# Patient Record
Sex: FEMALE | Race: BLACK OR AFRICAN AMERICAN | NOT HISPANIC OR LATINO | Employment: FULL TIME | URBAN - METROPOLITAN AREA
[De-identification: names, ages, dates, MRNs, and addresses within clinical notes are randomized per-mention and may not be internally consistent; named-entity substitution may affect disease eponyms.]

---

## 2019-02-11 ENCOUNTER — LAB VISIT (OUTPATIENT)
Dept: LAB | Facility: OTHER | Age: 49
End: 2019-02-11
Attending: OBSTETRICS & GYNECOLOGY
Payer: COMMERCIAL

## 2019-02-11 ENCOUNTER — OFFICE VISIT (OUTPATIENT)
Dept: OBSTETRICS AND GYNECOLOGY | Facility: CLINIC | Age: 49
End: 2019-02-11
Attending: OBSTETRICS & GYNECOLOGY
Payer: COMMERCIAL

## 2019-02-11 VITALS
WEIGHT: 140.19 LBS | HEIGHT: 66 IN | DIASTOLIC BLOOD PRESSURE: 78 MMHG | SYSTOLIC BLOOD PRESSURE: 122 MMHG | BODY MASS INDEX: 22.53 KG/M2

## 2019-02-11 DIAGNOSIS — Z12.11 COLON CANCER SCREENING: ICD-10-CM

## 2019-02-11 DIAGNOSIS — Z01.419 WELL WOMAN EXAM WITH ROUTINE GYNECOLOGICAL EXAM: Primary | ICD-10-CM

## 2019-02-11 DIAGNOSIS — Z01.419 WELL WOMAN EXAM WITH ROUTINE GYNECOLOGICAL EXAM: ICD-10-CM

## 2019-02-11 DIAGNOSIS — R63.4 WEIGHT LOSS: ICD-10-CM

## 2019-02-11 DIAGNOSIS — R19.7 DIARRHEA, UNSPECIFIED TYPE: ICD-10-CM

## 2019-02-11 DIAGNOSIS — Z12.31 VISIT FOR SCREENING MAMMOGRAM: ICD-10-CM

## 2019-02-11 DIAGNOSIS — R35.0 URINARY FREQUENCY: ICD-10-CM

## 2019-02-11 LAB
T4 FREE SERPL-MCNC: 2.33 NG/DL
TSH SERPL DL<=0.005 MIU/L-ACNC: <0.01 UIU/ML

## 2019-02-11 PROCEDURE — 88141 LIQUID-BASED PAP SMEAR, SCREENING: ICD-10-PCS | Mod: ,,, | Performed by: PATHOLOGY

## 2019-02-11 PROCEDURE — 36415 COLL VENOUS BLD VENIPUNCTURE: CPT

## 2019-02-11 PROCEDURE — 87624 HPV HI-RISK TYP POOLED RSLT: CPT

## 2019-02-11 PROCEDURE — 88141 CYTOPATH C/V INTERPRET: CPT | Mod: ,,, | Performed by: PATHOLOGY

## 2019-02-11 PROCEDURE — 99386 PR PREVENTIVE VISIT,NEW,40-64: ICD-10-PCS | Mod: S$GLB,,, | Performed by: OBSTETRICS & GYNECOLOGY

## 2019-02-11 PROCEDURE — 84439 ASSAY OF FREE THYROXINE: CPT

## 2019-02-11 PROCEDURE — 87086 URINE CULTURE/COLONY COUNT: CPT

## 2019-02-11 PROCEDURE — 83036 HEMOGLOBIN GLYCOSYLATED A1C: CPT

## 2019-02-11 PROCEDURE — 99386 PREV VISIT NEW AGE 40-64: CPT | Mod: S$GLB,,, | Performed by: OBSTETRICS & GYNECOLOGY

## 2019-02-11 PROCEDURE — 99999 PR PBB SHADOW E&M-NEW PATIENT-LVL III: CPT | Mod: PBBFAC,,, | Performed by: OBSTETRICS & GYNECOLOGY

## 2019-02-11 PROCEDURE — 99999 PR PBB SHADOW E&M-NEW PATIENT-LVL III: ICD-10-PCS | Mod: PBBFAC,,, | Performed by: OBSTETRICS & GYNECOLOGY

## 2019-02-11 PROCEDURE — 88175 CYTOPATH C/V AUTO FLUID REDO: CPT | Performed by: PATHOLOGY

## 2019-02-11 PROCEDURE — 84443 ASSAY THYROID STIM HORMONE: CPT

## 2019-02-11 NOTE — PROGRESS NOTES
SUBJECTIVE:   48 y.o. female   for annual routine Pap and checkup. Patient's last menstrual period was 2019..  She complains of weight loss, frequent stool and urination.        Past Medical History:   Diagnosis Date    Abnormal cervical Papanicolaou smear 2014    Colposcopy    Anemia     Gestational diabetes      Past Surgical History:   Procedure Laterality Date    COLPOSCOPY      DILATION AND CURETTAGE OF UTERUS       Social History     Socioeconomic History    Marital status:      Spouse name: Not on file    Number of children: Not on file    Years of education: Not on file    Highest education level: Not on file   Social Needs    Financial resource strain: Not on file    Food insecurity - worry: Not on file    Food insecurity - inability: Not on file    Transportation needs - medical: Not on file    Transportation needs - non-medical: Not on file   Occupational History    Not on file   Tobacco Use    Smoking status: Never Smoker    Smokeless tobacco: Never Used   Substance and Sexual Activity    Alcohol use: No    Drug use: No    Sexual activity: Yes     Partners: Male   Other Topics Concern    Not on file   Social History Narrative    Not on file     Family History   Problem Relation Age of Onset    Breast cancer Cousin     Ovarian cancer Neg Hx     Colon cancer Neg Hx      OB History    Para Term  AB Living   4 2 1 1 2 1   SAB TAB Ectopic Multiple Live Births   2       2      # Outcome Date GA Lbr Travon/2nd Weight Sex Delivery Anes PTL Lv   4  13 36w0d  3.005 kg (6 lb 10 oz) M Vag-Spont EPI N ND      Birth Comments: child  on 2013   3 Term 04 39w0d  3.402 kg (7 lb 8 oz) M  EPI N AMAN   2 2002 11w0d      N    1 2001 8w0d      N             No current outpatient medications on file.     No current facility-administered medications for this visit.      Allergies: Patient has no known allergies.     ROS:  Constitutional:  "no weight loss, weight gain, fever, fatigue  Eyes:  No vision changes, glasses/contacts  ENT/Mouth: No ulcers, sinus problems, ears ringing, headache  Cardiovascular: No inability to lie flat, chest pain, exercise intolerance, swelling, heart palpitations  Respiratory: No wheezing, coughing blood, shortness of breath, or cough  Gastrointestinal: No diarrhea, bloody stool, nausea/vomiting, constipation, gas, hemorrhoids  Genitourinary: No blood in urine, painful urination, urgency of urination, frequency of urination, incomplete emptying, incontinence, abnormal bleeding, painful periods, heavy periods, vaginal discharge, vaginal odor, painful intercourse, sexual problems, bleeding after intercourse.  Musculoskeletal: No muscle weakness  Skin/Breast: No painful breasts, nipple discharge, masses, rash, ulcers  Neurological: No passing out, seizures, numbness, headache  Endocrine: No diabetes, hypothyroid, hyperthyroid, hot flashes, hair loss, abnormal hair growth, ance  Psychiatric: No depression, crying  Hematologic: No bruises, bleeding, swollen lymph nodes, anemia.      OBJECTIVE:   The patient appears well, alert, oriented x 3, in no distress.  /78   Ht 5' 6" (1.676 m)   Wt 63.6 kg (140 lb 3.4 oz)   LMP 01/27/2019   BMI 22.63 kg/m²   NECK: no thyromegaly, trachea midline  SKIN: no acne, striae, hirsutism  BREAST EXAM: breasts appear normal, no suspicious masses, no skin or nipple changes or axillary nodes  ABDOMEN: no hernias, masses, or hepatosplenomegaly  GENITALIA: normal external genitalia, no erythema, no discharge  URETHRA: normal urethra, normal urethral meatus  VAGINA: Normal  CERVIX: no lesions or cervical motion tenderness  UTERUS: normal size, contour, position, consistency, mobility, non-tender  ADNEXA: normal adnexa and no mass, fullness, tenderness    \  ASSESSMENT:   .Lacey Barnes was seen today for well woman and irregualar bleeding.    Diagnoses and all orders for this visit:    Well " woman exam with routine gynecological exam  -     Liquid-based pap smear, screening  -     HPV High Risk Genotypes, PCR  -     Hemoglobin A1c; Future    Visit for screening mammogram  -     Mammo Digital Screening Bilat; Future    Urinary frequency  -     Urine culture    Weight loss  -     TSH; Future    Diarrhea, unspecified type  -     TSH; Future    Colon cancer screening  -     Ambulatory consult to Gastroenterology

## 2019-02-12 ENCOUNTER — HOSPITAL ENCOUNTER (OUTPATIENT)
Dept: RADIOLOGY | Facility: OTHER | Age: 49
Discharge: HOME | End: 2019-02-12
Attending: OBSTETRICS & GYNECOLOGY
Payer: COMMERCIAL

## 2019-02-12 DIAGNOSIS — Z12.31 VISIT FOR SCREENING MAMMOGRAM: ICD-10-CM

## 2019-02-12 LAB
BACTERIA UR CULT: NORMAL
ESTIMATED AVG GLUCOSE: 126 MG/DL
HBA1C MFR BLD HPLC: 6 %

## 2019-02-12 PROCEDURE — 77067 SCR MAMMO BI INCL CAD: CPT | Mod: 26,,, | Performed by: INTERNAL MEDICINE

## 2019-02-12 PROCEDURE — 77063 MAMMO DIGITAL SCREENING BILAT WITH TOMOSYNTHESIS_CAD: ICD-10-PCS | Mod: 26,,, | Performed by: INTERNAL MEDICINE

## 2019-02-12 PROCEDURE — 77063 BREAST TOMOSYNTHESIS BI: CPT | Mod: 26,,, | Performed by: INTERNAL MEDICINE

## 2019-02-12 PROCEDURE — 77067 SCR MAMMO BI INCL CAD: CPT | Mod: TC

## 2019-02-12 PROCEDURE — 77067 MAMMO DIGITAL SCREENING BILAT WITH TOMOSYNTHESIS_CAD: ICD-10-PCS | Mod: 26,,, | Performed by: INTERNAL MEDICINE

## 2019-02-14 LAB
HPV HR 12 DNA CVX QL NAA+PROBE: POSITIVE
HPV16 AG SPEC QL: NEGATIVE
HPV18 DNA SPEC QL NAA+PROBE: NEGATIVE

## 2019-02-19 ENCOUNTER — TELEPHONE (OUTPATIENT)
Dept: OBSTETRICS AND GYNECOLOGY | Facility: CLINIC | Age: 49
End: 2019-02-19

## 2019-02-20 NOTE — TELEPHONE ENCOUNTER
Called and notified of abnormal pap.  Please schedule colpo.  Please make appointment with pcp asap

## 2019-02-21 ENCOUNTER — TELEPHONE (OUTPATIENT)
Dept: OBSTETRICS AND GYNECOLOGY | Facility: CLINIC | Age: 49
End: 2019-02-21

## 2019-02-21 ENCOUNTER — PATIENT MESSAGE (OUTPATIENT)
Dept: OBSTETRICS AND GYNECOLOGY | Facility: CLINIC | Age: 49
End: 2019-02-21

## 2019-02-21 NOTE — TELEPHONE ENCOUNTER
----- Message from Reema Santiago LPN sent at 2/20/2019 10:27 AM CST -----  Called and notified of abnormal pap.  Please schedule colpo.  Please make appointment with pcp asap

## 2019-02-21 NOTE — TELEPHONE ENCOUNTER
Called pt to schedule appt for colposcopy. No answer. Unable to leave message because mailbox was full. [employer] message sent.

## 2019-03-13 ENCOUNTER — PATIENT MESSAGE (OUTPATIENT)
Dept: OBSTETRICS AND GYNECOLOGY | Facility: CLINIC | Age: 49
End: 2019-03-13

## 2019-03-13 ENCOUNTER — TELEPHONE (OUTPATIENT)
Dept: OBSTETRICS AND GYNECOLOGY | Facility: CLINIC | Age: 49
End: 2019-03-13

## 2019-03-13 NOTE — TELEPHONE ENCOUNTER
Called pt to schedule appt for colposcopy. No answer Unable to leave message because VM box was full. connex[employer]iot message sent.

## 2019-03-13 NOTE — TELEPHONE ENCOUNTER
----- Message from Reema Santiago LPN sent at 3/12/2019  4:42 PM CDT -----  Called and notified of abnormal pap.  Please schedule colpo.  Please make appointment with pcp asap      Documentation

## 2019-03-29 ENCOUNTER — PATIENT MESSAGE (OUTPATIENT)
Dept: OBSTETRICS AND GYNECOLOGY | Facility: CLINIC | Age: 49
End: 2019-03-29

## 2019-03-29 NOTE — TELEPHONE ENCOUNTER
Called pt to schedule an appointment for a colposcopy. No answer. Unable to leave message because VM box was full. Sent [employer] message.

## 2021-04-26 ENCOUNTER — PATIENT MESSAGE (OUTPATIENT)
Dept: RESEARCH | Facility: HOSPITAL | Age: 51
End: 2021-04-26

## 2022-09-28 ENCOUNTER — TELEPHONE (OUTPATIENT)
Dept: OBSTETRICS AND GYNECOLOGY | Facility: CLINIC | Age: 52
End: 2022-09-28
Payer: COMMERCIAL

## 2022-09-28 NOTE — TELEPHONE ENCOUNTER
Left message for pt to call the office back to reschedule upcoming appt on 10/17/2022 with Marie Zuleta NP before 5pm

## 2022-09-29 ENCOUNTER — TELEPHONE (OUTPATIENT)
Dept: OBSTETRICS AND GYNECOLOGY | Facility: CLINIC | Age: 52
End: 2022-09-29
Payer: COMMERCIAL

## 2022-09-29 NOTE — TELEPHONE ENCOUNTER
Called pt to reschedule upcoming appt on 10/17/2022. Left message for pt to give office a call back at [phone] before 5pm today.

## 2022-10-04 ENCOUNTER — TELEPHONE (OUTPATIENT)
Dept: OBSTETRICS AND GYNECOLOGY | Facility: CLINIC | Age: 52
End: 2022-10-04
Payer: COMMERCIAL

## 2022-10-04 NOTE — TELEPHONE ENCOUNTER
Called pt to reschedule upcoming appt on 10/17/2022 with JOEL Salazar as JOEL Salazar will be out of the office. Pt says she will call central scheduling to see if she can keep her appt on that day and see a different provider. Pt stated she would call clinic back to follow up with her decision.

## 2023-08-09 PROBLEM — Z00.00 WELL ADULT EXAM: Status: ACTIVE | Noted: 2023-08-09

## 2023-08-09 PROBLEM — I10 PRIMARY HYPERTENSION: Chronic | Status: ACTIVE | Noted: 2023-08-09

## 2023-08-09 PROBLEM — E11.9 TYPE 2 DIABETES MELLITUS WITHOUT COMPLICATION, WITHOUT LONG-TERM CURRENT USE OF INSULIN: Chronic | Status: ACTIVE | Noted: 2023-08-09

## 2023-08-09 PROBLEM — E78.00 PURE HYPERCHOLESTEROLEMIA: Chronic | Status: ACTIVE | Noted: 2023-08-09

## 2023-08-16 ENCOUNTER — TELEPHONE (OUTPATIENT)
Dept: SURGERY | Facility: CLINIC | Age: 53
End: 2023-08-16
Payer: COMMERCIAL

## 2023-08-16 ENCOUNTER — PATIENT MESSAGE (OUTPATIENT)
Dept: SURGERY | Facility: CLINIC | Age: 53
End: 2023-08-16
Payer: COMMERCIAL

## 2023-08-16 NOTE — TELEPHONE ENCOUNTER
The patient has been advised the Colonoscopy Prep Kit will be ordered from the patient's verified preferred pharmacy on file. The medication can  be picked up by the patient, or the patient's designated representative.The patient was further explained the Colonoscopy Prep instructions will be mailed to the patient verified mailing address on file, or put onto the [employer] portal, whichever method of delivery the patient prefers.Additionally this patient was informed,not to follow the instructions that comes with the bowel prep medication. However, the patient was instructed to please follow the Colonoscopy Bowel Prep instructions that's being provided by the . The patient was asked to please to follow the Colonoscopy Prep instructions being provided as precisely,and  meticulously as possible.The patient was advised you  will receive a follow up phone call to summarize the Colonoscopy Prep instructions prior to the scheduled Colonoscopy procedure date. At this time the patient will be given an opportunity to ask any questions regarding the Colonoscopy procedure, and it's associated Bowel Prep instructions.

## 2023-08-16 NOTE — TELEPHONE ENCOUNTER
Called patient in reference to a referral to Colorectal Surgery for colon cancer screening. Patient verbally consented to a Colonoscopy and requested to be scheduled for a Colonoscopy on 10/18/2023 Patient was advised a designated  is required on the day of the Colonoscopy to drive the patient home and the  must be at least. 18 years old.Colonoscopy Prep instructions were thoroughly explained and discussed with the patient.It was emphasized, and reiterated to the patient, to please not to follow the bowel prep instructions that comes with the bowel prep package.However, to please follow the prep instructions that will be received in the mail,or via the [employer] portal, or by both modes of delivery, which ever method of delivery the patient prefers,from the [facility] LPN   Patient acknowledges understanding Prep instructions as explained and discussed on the phone.. Patient was advised the Colonoscopy Prep instructions discussed and explained on the phone,are being mailed out to the patient's verified address on file,or put onto the [employer] portal,or both methods of delivery, whichever the patient prefers. Patient's address on file was verified with the patient for accuracy of mailing. Patient's medications on file was reviewed with the patient for accuracy of information. Patient denies taking  any other medications other than those listed and verified on medication profile.Patient was explained the Colonoscopy will be performed here at [facility]. Patient was further explained the Pre-Op will call one day prior to the procedure date, to discuss Pre-Op instructions;and what time to report for the Colonoscopy. The patient was given the opportunity to ask any questions about the Colonoscopy. No further issues were discussed.

## 2023-08-17 RX ORDER — SODIUM, POTASSIUM,MAG SULFATES 17.5-3.13G
1 SOLUTION, RECONSTITUTED, ORAL ORAL DAILY
Qty: 1 KIT | Refills: 0 | Status: SHIPPED | OUTPATIENT
Start: 2023-08-17 | End: 2023-08-19

## 2023-08-24 ENCOUNTER — HOSPITAL ENCOUNTER (OUTPATIENT)
Dept: RADIOLOGY | Facility: HOSPITAL | Age: 53
Discharge: HOME | End: 2023-08-24
Attending: NURSE PRACTITIONER
Payer: COMMERCIAL

## 2023-08-24 DIAGNOSIS — Z12.39 ENCOUNTER FOR SCREENING FOR MALIGNANT NEOPLASM OF BREAST, UNSPECIFIED SCREENING MODALITY: ICD-10-CM

## 2023-08-24 PROCEDURE — 77067 SCR MAMMO BI INCL CAD: CPT | Mod: 26,,, | Performed by: RADIOLOGY

## 2023-08-24 PROCEDURE — 77067 SCR MAMMO BI INCL CAD: CPT | Mod: TC

## 2023-08-24 PROCEDURE — 77067 MAMMO DIGITAL SCREENING BILAT WITH TOMO: ICD-10-PCS | Mod: 26,,, | Performed by: RADIOLOGY

## 2023-08-24 PROCEDURE — 77063 BREAST TOMOSYNTHESIS BI: CPT | Mod: 26,,, | Performed by: RADIOLOGY

## 2023-08-24 PROCEDURE — 77063 MAMMO DIGITAL SCREENING BILAT WITH TOMO: ICD-10-PCS | Mod: 26,,, | Performed by: RADIOLOGY

## 2023-09-06 PROBLEM — Z00.00 WELL ADULT EXAM: Status: RESOLVED | Noted: 2023-08-09 | Resolved: 2023-09-06

## 2023-09-13 ENCOUNTER — TELEPHONE (OUTPATIENT)
Dept: DIABETES | Facility: CLINIC | Age: 53
End: 2023-09-13
Payer: COMMERCIAL

## 2023-09-18 ENCOUNTER — PATIENT MESSAGE (OUTPATIENT)
Dept: PEDIATRICS | Facility: CLINIC | Age: 53
End: 2023-09-18
Payer: COMMERCIAL

## 2023-09-21 ENCOUNTER — CLINICAL SUPPORT (OUTPATIENT)
Dept: DIABETES | Facility: CLINIC | Age: 53
End: 2023-09-21
Payer: COMMERCIAL

## 2023-09-21 ENCOUNTER — OFFICE VISIT (OUTPATIENT)
Dept: DIABETES | Facility: CLINIC | Age: 53
End: 2023-09-21
Payer: COMMERCIAL

## 2023-09-21 VITALS
OXYGEN SATURATION: 100 % | HEART RATE: 80 BPM | HEIGHT: 66 IN | SYSTOLIC BLOOD PRESSURE: 132 MMHG | DIASTOLIC BLOOD PRESSURE: 80 MMHG | WEIGHT: 176.69 LBS | BODY MASS INDEX: 28.4 KG/M2

## 2023-09-21 DIAGNOSIS — E11.65 TYPE 2 DIABETES MELLITUS WITH HYPERGLYCEMIA, WITHOUT LONG-TERM CURRENT USE OF INSULIN: Primary | ICD-10-CM

## 2023-09-21 DIAGNOSIS — F41.9 ANXIETY: ICD-10-CM

## 2023-09-21 DIAGNOSIS — E11.9 NEW ONSET TYPE 2 DIABETES MELLITUS: ICD-10-CM

## 2023-09-21 DIAGNOSIS — E78.5 DYSLIPIDEMIA, GOAL LDL BELOW 100: ICD-10-CM

## 2023-09-21 DIAGNOSIS — Z71.9 HEALTH EDUCATION/COUNSELING: ICD-10-CM

## 2023-09-21 DIAGNOSIS — E66.3 OVERWEIGHT (BMI 25.0-29.9): ICD-10-CM

## 2023-09-21 DIAGNOSIS — I10 PRIMARY HYPERTENSION: Chronic | ICD-10-CM

## 2023-09-21 DIAGNOSIS — E11.65 TYPE 2 DIABETES MELLITUS WITH HYPERGLYCEMIA, UNSPECIFIED WHETHER LONG TERM INSULIN USE: Primary | ICD-10-CM

## 2023-09-21 LAB — GLUCOSE SERPL-MCNC: 87 MG/DL (ref 70–110)

## 2023-09-21 PROCEDURE — 3079F DIAST BP 80-89 MM HG: CPT | Mod: CPTII,S$GLB,, | Performed by: NURSE PRACTITIONER

## 2023-09-21 PROCEDURE — 3044F PR MOST RECENT HEMOGLOBIN A1C LEVEL <7.0%: ICD-10-PCS | Mod: CPTII,S$GLB,, | Performed by: NURSE PRACTITIONER

## 2023-09-21 PROCEDURE — 99204 OFFICE O/P NEW MOD 45 MIN: CPT | Mod: S$GLB,,, | Performed by: NURSE PRACTITIONER

## 2023-09-21 PROCEDURE — 99204 PR OFFICE/OUTPT VISIT, NEW, LEVL IV, 45-59 MIN: ICD-10-PCS | Mod: S$GLB,,, | Performed by: NURSE PRACTITIONER

## 2023-09-21 PROCEDURE — 99999 PR PBB SHADOW E&M-EST. PATIENT-LVL I: CPT | Mod: PBBFAC,,,

## 2023-09-21 PROCEDURE — 82962 POCT GLUCOSE, HAND-HELD DEVICE: ICD-10-PCS | Mod: S$GLB,,, | Performed by: NURSE PRACTITIONER

## 2023-09-21 PROCEDURE — 3008F PR BODY MASS INDEX (BMI) DOCUMENTED: ICD-10-PCS | Mod: CPTII,S$GLB,, | Performed by: NURSE PRACTITIONER

## 2023-09-21 PROCEDURE — 3075F SYST BP GE 130 - 139MM HG: CPT | Mod: CPTII,S$GLB,, | Performed by: NURSE PRACTITIONER

## 2023-09-21 PROCEDURE — 3008F BODY MASS INDEX DOCD: CPT | Mod: CPTII,S$GLB,, | Performed by: NURSE PRACTITIONER

## 2023-09-21 PROCEDURE — 3075F PR MOST RECENT SYSTOLIC BLOOD PRESS GE 130-139MM HG: ICD-10-PCS | Mod: CPTII,S$GLB,, | Performed by: NURSE PRACTITIONER

## 2023-09-21 PROCEDURE — 3066F PR DOCUMENTATION OF TREATMENT FOR NEPHROPATHY: ICD-10-PCS | Mod: CPTII,S$GLB,, | Performed by: NURSE PRACTITIONER

## 2023-09-21 PROCEDURE — 82962 GLUCOSE BLOOD TEST: CPT | Mod: S$GLB,,, | Performed by: NURSE PRACTITIONER

## 2023-09-21 PROCEDURE — 1160F PR REVIEW ALL MEDS BY PRESCRIBER/CLIN PHARMACIST DOCUMENTED: ICD-10-PCS | Mod: CPTII,S$GLB,, | Performed by: NURSE PRACTITIONER

## 2023-09-21 PROCEDURE — 99999 PR PBB SHADOW E&M-EST. PATIENT-LVL IV: ICD-10-PCS | Mod: PBBFAC,,, | Performed by: NURSE PRACTITIONER

## 2023-09-21 PROCEDURE — 1159F MED LIST DOCD IN RCRD: CPT | Mod: CPTII,S$GLB,, | Performed by: NURSE PRACTITIONER

## 2023-09-21 PROCEDURE — 3061F NEG MICROALBUMINURIA REV: CPT | Mod: CPTII,S$GLB,, | Performed by: NURSE PRACTITIONER

## 2023-09-21 PROCEDURE — 1160F RVW MEDS BY RX/DR IN RCRD: CPT | Mod: CPTII,S$GLB,, | Performed by: NURSE PRACTITIONER

## 2023-09-21 PROCEDURE — 3079F PR MOST RECENT DIASTOLIC BLOOD PRESSURE 80-89 MM HG: ICD-10-PCS | Mod: CPTII,S$GLB,, | Performed by: NURSE PRACTITIONER

## 2023-09-21 PROCEDURE — 99999 PR PBB SHADOW E&M-EST. PATIENT-LVL IV: CPT | Mod: PBBFAC,,, | Performed by: NURSE PRACTITIONER

## 2023-09-21 PROCEDURE — 3061F PR NEG MICROALBUMINURIA RESULT DOCUMENTED/REVIEW: ICD-10-PCS | Mod: CPTII,S$GLB,, | Performed by: NURSE PRACTITIONER

## 2023-09-21 PROCEDURE — 3066F NEPHROPATHY DOC TX: CPT | Mod: CPTII,S$GLB,, | Performed by: NURSE PRACTITIONER

## 2023-09-21 PROCEDURE — 99999 PR PBB SHADOW E&M-EST. PATIENT-LVL I: ICD-10-PCS | Mod: PBBFAC,,,

## 2023-09-21 PROCEDURE — 1159F PR MEDICATION LIST DOCUMENTED IN MEDICAL RECORD: ICD-10-PCS | Mod: CPTII,S$GLB,, | Performed by: NURSE PRACTITIONER

## 2023-09-21 PROCEDURE — 3044F HG A1C LEVEL LT 7.0%: CPT | Mod: CPTII,S$GLB,, | Performed by: NURSE PRACTITIONER

## 2023-09-21 RX ORDER — BLOOD-GLUCOSE SENSOR
1 EACH MISCELLANEOUS
Qty: 3 EACH | Refills: 11 | Status: SHIPPED | OUTPATIENT
Start: 2023-09-21 | End: 2023-11-22

## 2023-09-21 RX ORDER — LANCETS 33 GAUGE
1 EACH MISCELLANEOUS 3 TIMES DAILY
Qty: 100 EACH | Refills: 11 | Status: SHIPPED | OUTPATIENT
Start: 2023-09-21 | End: 2023-11-26 | Stop reason: SDUPTHER

## 2023-09-21 RX ORDER — BLOOD SUGAR DIAGNOSTIC
1 STRIP MISCELLANEOUS 3 TIMES DAILY
Qty: 100 EACH | Refills: 11 | Status: SHIPPED | OUTPATIENT
Start: 2023-09-21 | End: 2023-10-24 | Stop reason: SDUPTHER

## 2023-09-21 RX ORDER — METFORMIN HYDROCHLORIDE 500 MG/1
500 TABLET, EXTENDED RELEASE ORAL 2 TIMES DAILY WITH MEALS
Qty: 180 TABLET | Refills: 1 | Status: SHIPPED | OUTPATIENT
Start: 2023-09-21 | End: 2023-11-08

## 2023-09-21 NOTE — ASSESSMENT & PLAN NOTE
Unsure if this is a true dx.   BP WNL today and she has not taking her BP medication in a while.   BP goal is < 140/90.   Controlled   Blood pressure goals discussed with patient

## 2023-09-21 NOTE — ASSESSMENT & PLAN NOTE
Variable BG readings   A1c is not that uncontrolled and BG on last was normal   She reports multiple BG readings in the 200's though?   Will put a sample dexcom on her and see if she can f/u with education and download the sensor to see readings         -- Medication Changes:     I think its ok to continue with Metformin 500 mg daily   We can change to XR tablets for you to see if that is better   Take with daily with food   If your readings are running 150-160's before meals and in the morning   Add in the 2nd metformin tablet in the PM before dinner       -- Reviewed goals of therapy are to get the best control we can without hypoglycemia.    -- Advised frequent self blood glucose monitoring.  Patient encouraged to document glucose results and bring them to every clinic visit. RX for dexcom G7 - sample dexcom given in clinic today   -- Hypoglycemia precautions discussed. Instructed on precautions before driving.    -- Call for Bg repeatedly < 70 or > 180.   -- Close adherence to lifestyle changes recommended.   -- Periodic follow ups for eye evaluations, foot care and dental care suggested.  -- Refer to diabetes education-- new diagnosis, diet, comprehensive review, dexcom G7 sample download

## 2023-09-21 NOTE — ASSESSMENT & PLAN NOTE
She has not started the statin   I think it is reasonable to hold off until feeling better.   Discussed ADA recommendations and goals   LDL goal <100

## 2023-09-21 NOTE — ASSESSMENT & PLAN NOTE
Body mass index is 28.52 kg/m².  Increases insulin resistance.   Discussed DM diet and exercise.

## 2023-09-21 NOTE — PROGRESS NOTES
"DEXCOM G7 CGM SAMPLE/ TRAINING   Dexcom G7 & Clarity mobile apps downloaded to phone. Education was provided using "Quick Start Guide" and demo device per Dexcom protocol. Clarity clinic data share set-up.      ? Overview:  5 minute glucose reading updates, trending arrows, BG graph screens, reports screen,  connectivity and functions.   ? Menus: Trend graph, start sensor, enter BG, events, alerts, settings, replace sensor, stop sensor, and shutdown  ?  Settings:                          * Urgent Low: 55 mg/dL                          * Urgent Low Soon: On                          * Low Alert:70 mg/dL                          * High Alert: 250 mg/dL                          * Rise Rate: Off                          * Fall Rate: Off                          * Signal Loss: On                          * Temporary Sensor Issue: On     ? Reviewed additional setting options.  ? Pt paired sensor with .  Sensor number [id number]  ? Reviewed where to find sensor insertion time and expiration date.   ? Reviewed appropriate calibration techniques.  ? Reviewed sensor site selection. Pt selected right arm and prepped site using aseptic technique, inserted sensor, applied overlay tape and started session.   ? Reviewed sensor removal from site. Discussed times to remove sensor per  guidelines include MRI or diatherapy.   ? Patient able to demonstrate without difficulty. Encouraged to review manual and/or training videos prior to starting another sensor.   ? Reviewed problem solving aspects of sensor transmission/variables that can disrupt RF transmission.  range 20 feet, but the first 3 hrs keep within 3 feet of transmitter.  ? Pt instructed on lag time of interstitial fluid from CBG and was advised to tx hypoglycemia and dose insulin based on SMBG values.  ? Dexcom technical support contact number given and examples of when to contact them discussed.        Dexcom Login :   Username: " [email or web address]  Password: [email or web address]

## 2023-09-21 NOTE — PROGRESS NOTES
CC:   Chief Complaint   Patient presents with    Diabetes Mellitus       HPI: Lacey Lange is a 52 y.o. female presents for an initial visit today for the management of T2DM    She was diagnosed with Type 2 diabetes in July 2023 and was started on Metformin BID   Prescribed Trulicity but never received it     2019- with prediabetes   2013- gestational diabetes   Lost a baby at 8 months prior to her current daughter       Family hx of diabetes: mother, father,   Hospitalized for diabetes: denies   Insulin therapy: never on insulin     No personal or FH of thyroid cancer or personal of pancreatic cancer or pancreatitis.     She was recently started on metformin and prescribed to take it b.i.d. but she is only taking it daily  She is very anxious about having diabetes and was checking her blood sugars multiple times a day and ran out of strips and lancets--she went through 50 of them in 1 week  She is asking to try a personal continuous glucose monitor  She never received the Trulicity---and she would like to stay off the Trulicity if possible  She is very vague about her blood sugar readings but it also sounds like she was checking too much  She would like to meet with diabetes education to get formal education on diet  She reports numbness and tingling in her feet were present prior to starting metformin and prior to her diagnosis    DIABETES COMPLICATIONS: none      Diabetes Management Status    ASA:  No    Statin: not taking statin   ACE/ARB: not taking the losartan HCTZ     Screening or Prevention Patient's value Goal Complete/Controlled?   HgA1C Testing and Control   Lab Results   Component Value Date    HGBA1C 6.8 (H) 09/09/2023      Annually/Less than 8% Yes   Lipid profile : 09/09/2023 Annually Yes   LDL control Lab Results   Component Value Date    LDLCALC 115.2 09/09/2023    Annually/Less than 100 mg/dl  No   Nephropathy screening Lab Results   Component Value Date    LABMICR 17.0 08/26/2023      Lab Results   Component Value Date    PROTEINUA Trace (A) 09/09/2023    Annually Yes   Blood pressure BP Readings from Last 1 Encounters:   09/21/23 132/80    Less than 140/90 Yes   Dilated retinal exam Most Recent Eye Exam Date: Not Found Annually Yes   Foot exam   : 09/21/2023 Annually No       CURRENT A1C:    Hemoglobin A1C   Date Value Ref Range Status   09/09/2023 6.8 (H) 4.0 - 5.6 % Final     Comment:     ADA Screening Guidelines:  5.7-6.4%  Consistent with prediabetes  >or=6.5%  Consistent with diabetes    High levels of fetal hemoglobin interfere with the HbA1C  assay. Heterozygous hemoglobin variants (HbS, HgC, etc)do  not significantly interfere with this assay.   However, presence of multiple variants may affect accuracy.     02/11/2019 6.0 (H) 4.0 - 5.6 % Final     Comment:     ADA Screening Guidelines:  5.7-6.4%  Consistent with prediabetes  >or=6.5%  Consistent with diabetes  High levels of fetal hemoglobin interfere with the HbA1C  assay. Heterozygous hemoglobin variants (HbS, HgC, etc)do  not significantly interfere with this assay.   However, presence of multiple variants may affect accuracy.     07/11/2013 6.3 (H) 4.0 - 6.2 % Final       GOAL A1C: 6.5% without hypoglycemia     DM MEDICATIONS USED IN THE PAST: Metformin   Trulicity  -- never started       CURRENT DIABETES MEDICATIONS: Metformin 500 mg daily   Insulin: N/A    Missed doses: denies       BLOOD GLUCOSE MONITORING:    She is constantly checking her BG   She is going through 50 strips in 1 weeks   214  200  197   180  133 this AM       BG in clinic   Results for orders placed or performed in visit on 09/21/23   POCT Glucose, Hand-Held Device   Result Value Ref Range    POC Glucose 87 70 - 110 MG/DL           HYPOGLYCEMIA:  No        MEALS: eating 3 meals per day   BF: cereal or waffle no syrup   Lunch: turkey sandwich on wheat + fruit - apple- grapes   Dinner:  Snack: wheat thins   Drinks: water        CURRENT EXERCISE:  No formal        Review of Systems  Review of Systems   Constitutional:  Positive for appetite change. Negative for fatigue and unexpected weight change.   HENT:  Negative for trouble swallowing.    Eyes:  Positive for visual disturbance.   Respiratory:  Negative for shortness of breath.    Cardiovascular:  Positive for chest pain (occ).   Gastrointestinal:  Negative for abdominal distention, abdominal pain, diarrhea and nausea.   Endocrine: Negative for polydipsia, polyphagia and polyuria.   Genitourinary:         No Nocturia    Musculoskeletal:  Positive for back pain.   Skin:  Negative for wound.   Neurological:  Positive for numbness and headaches.   Psychiatric/Behavioral:  The patient is nervous/anxious.        Physical Exam   Physical Exam  Vitals and nursing note reviewed.   Constitutional:       General: She is not in acute distress.     Appearance: She is well-developed. She is not ill-appearing.      Comments: Overweight female    HENT:      Head: Normocephalic and atraumatic.      Right Ear: External ear normal.      Left Ear: External ear normal.      Nose: Nose normal.   Neck:      Thyroid: No thyromegaly.      Trachea: No tracheal deviation.   Cardiovascular:      Rate and Rhythm: Normal rate and regular rhythm.      Heart sounds: No murmur heard.  Pulmonary:      Effort: Pulmonary effort is normal. No respiratory distress.      Breath sounds: Normal breath sounds.   Abdominal:      Palpations: Abdomen is soft.      Tenderness: There is no abdominal tenderness.      Hernia: No hernia is present.   Musculoskeletal:      Cervical back: Normal range of motion and neck supple.   Skin:     General: Skin is warm and dry.      Capillary Refill: Capillary refill takes less than 2 seconds.      Findings: No rash.   Neurological:      Mental Status: She is alert and oriented to person, place, and time.      Cranial Nerves: No cranial nerve deficit.   Psychiatric:         Mood and Affect: Mood is anxious.         Behavior:  Behavior normal.         Judgment: Judgment normal.         FOOT EXAMINATION: Appropriate  footwear     Protective Sensation (w/ 10 gram monofilament):  Right: Intact  Left: Intact    Visual Inspection:  Normal -  Bilateral and Nails Intact - without Evidence of Foot Deformity- Bilateral    Pedal Pulses:   Right: Present  Left: Present    Posterior Tibialis Pulses:   Right:Present  Left: Present        Lab Results   Component Value Date    TSH 0.877 09/09/2023           Type 2 diabetes mellitus with hyperglycemia, without long-term current use of insulin  Variable BG readings   A1c is not that uncontrolled and BG on last was normal   She reports multiple BG readings in the 200's though?   Will put a sample dexcom on her and see if she can f/u with education and download the sensor to see readings         -- Medication Changes:     I think its ok to continue with Metformin 500 mg daily   We can change to XR tablets for you to see if that is better   Take with daily with food   If your readings are running 150-160's before meals and in the morning   Add in the 2nd metformin tablet in the PM before dinner       -- Reviewed goals of therapy are to get the best control we can without hypoglycemia.    -- Advised frequent self blood glucose monitoring.  Patient encouraged to document glucose results and bring them to every clinic visit. RX for dexcom G7 - sample dexcom given in clinic today   -- Hypoglycemia precautions discussed. Instructed on precautions before driving.    -- Call for Bg repeatedly < 70 or > 180.   -- Close adherence to lifestyle changes recommended.   -- Periodic follow ups for eye evaluations, foot care and dental care suggested.  -- Refer to diabetes education-- new diagnosis, diet, comprehensive review, dexcom G7 sample download         Primary hypertension  Unsure if this is a true dx.   BP WNL today and she has not taking her BP medication in a while.   BP goal is < 140/90.   Controlled   Blood  pressure goals discussed with patient      Dyslipidemia, goal LDL below 100  She has not started the statin   I think it is reasonable to hold off until feeling better.   Discussed ADA recommendations and goals   LDL goal <100    Overweight (BMI 25.0-29.9)  Body mass index is 28.52 kg/m².  Increases insulin resistance.   Discussed DM diet and exercise.         I spent a total of 45 minutes on the day of the visit.This includes face to face time and non-face to face time preparing to see the patient (eg, review of tests), obtaining and/or reviewing separately obtained history, documenting clinical information in the electronic or other health record, independently interpreting results and communicating results to the patient/family/caregiver, or care coordinator.      Follow up in about 4 months (around 1/21/2024).  Dexcom G7 sample   See mary for diet teaching / comprehensive review   Follow up with me in 4 months with labs prior     Orders Placed This Encounter   Procedures    Hemoglobin A1C     Standing Status:   Future     Standing Expiration Date:   3/21/2025    Basic Metabolic Panel     Standing Status:   Future     Standing Expiration Date:   3/21/2025    Ambulatory referral/consult to Diabetes Education     Standing Status:   Future     Standing Expiration Date:   3/21/2025     Referral Priority:   Routine     Referral Type:   Consultation     Referral Reason:   Specialty Services Required     Referred to Provider:   Mary Horton, RD, CDE     Requested Specialty:   Diabetes     Number of Visits Requested:   1    POCT Glucose, Hand-Held Device       Recommendations were discussed with the patient in detail  The patient verbalized understanding and agrees with the plan outlined as above.     This note was partly generated with [employer] voice recognition software. I apologize for any possible typographical errors.

## 2023-09-21 NOTE — PATIENT INSTRUCTIONS
I think its ok to continue with Metformin 500 mg daily   We can change to XR tablets for you to see if that is better   Take with daily with food   If your readings are running 150-160's before meals and in the morning   Add in the 2nd metformin tablet in the PM before dinner

## 2023-09-29 ENCOUNTER — TELEPHONE (OUTPATIENT)
Dept: DIABETES | Facility: CLINIC | Age: 53
End: 2023-09-29
Payer: COMMERCIAL

## 2023-09-29 NOTE — TELEPHONE ENCOUNTER
----- Message from Winston Lewis sent at 9/29/2023  8:56 AM CDT -----  Regarding: advise; DEXCOM G7  Contact: PT @ [phone]  Pt is calling asking to speak with someone in the office to advise that she is needing assistance with using, blood-glucose sensor (DEXCOM G7 SENSOR) Nan. Pt states that she does not have the pads for it and does not know how to use the device.    Pt is asking for a call back to advise; she is not sure what to do. Thanks.

## 2023-10-02 ENCOUNTER — NUTRITION (OUTPATIENT)
Dept: DIABETES | Facility: CLINIC | Age: 53
End: 2023-10-02
Payer: COMMERCIAL

## 2023-10-02 DIAGNOSIS — E11.65 TYPE 2 DIABETES MELLITUS WITH HYPERGLYCEMIA, UNSPECIFIED WHETHER LONG TERM INSULIN USE: Primary | ICD-10-CM

## 2023-10-02 DIAGNOSIS — E11.65 TYPE 2 DIABETES MELLITUS WITH HYPERGLYCEMIA, WITHOUT LONG-TERM CURRENT USE OF INSULIN: ICD-10-CM

## 2023-10-02 PROCEDURE — G0108 PR DIAB MANAGE TRN  PER INDIV: ICD-10-PCS | Mod: S$GLB,,, | Performed by: DIETITIAN, REGISTERED

## 2023-10-02 PROCEDURE — G0108 DIAB MANAGE TRN  PER INDIV: HCPCS | Mod: S$GLB,,, | Performed by: DIETITIAN, REGISTERED

## 2023-10-03 NOTE — PROGRESS NOTES
Diabetes Care Specialist Progress Note  Author: Cheri Horton RD, CDE  Date: 10/3/2023    Program Intake  Reason for Diabetes Program Visit:: Initial Diabetes Assessment  Type of Intervention:: Individual  Individual: Device Training  Device Training: Personal CGM (Dexcom G7)  Current diabetes risk level:: low (HgbA1c 6.8)  In the last 12 months, have you:: none  Permission to speak with others about care:: no    Lab Results   Component Value Date    HGBA1C 6.8 (H) 09/09/2023       Clinical            There is no height or weight on file to calculate BMI.    Patient Health Rating  Compared to other people your age, how would you rate your health?: Good    Problem Review  Reviewed Problem List with Patient: no (see comments) (not enough time)  Active comorbidities affecting diabetes self-care.: yes  Comorbidities: Hypertension, Cardiovascular Disease, Other (comment) (anemia)    Clinical Assessment  Current Diabetes Treatment: Oral Medication (metformin)  Have you ever experienced hypoglycemia (low blood sugar)?: no  Have you ever experienced hyperglycemia (high blood sugar)?: yes  In the last month, how often have you experienced high blood sugar?: more than once a week    Medication Information  How do you obtain your medications?: Patient drives, Family picks up  Do you use a pill box or medication chart to help you manage your medications?: No  Do you sometimes have difficulty refilling your medications?: No (did not receive the trulicity)  Medication adherence impacting ability to self-manage diabetes?: No    Labs  Do you have regular lab work to monitor your medications?: No (new diagnosis)  Lab Compliance Barriers: No    Nutritional Status  Current nutritional status an area of need that is impacting patient's ability to self-manage diabetes?:  (unable to get too due to 10 minute visit)    Additional Social History    Support  Does anyone support you with your diabetes care?: yes  Who supports you?: parent,  self, family member  Who takes you to your medical appointments?: self  Does the current support meet the patient's needs?: Yes  Is Support an area impacting ability to self-manage diabetes?: No    Access to Mass Media & Technology  Does the patient have access to any of the following devices or technologies?: Smart phone  Media or technology needs impacting ability to self-manage diabetes?: No    Cognitive/Behavioral Health  Alert and Oriented: Yes  Difficulty Thinking: No  Requires Prompting: No  Requires assistance for routine expression?: No  Cognitive or behavioral barriers impacting ability to self-manage diabetes?: No    Culture/Yazidism  Culture or Denominational beliefs that may impact ability to access healthcare: No    Communication  Language preference: English  Hearing Problems: No  Vision Problems: No  Communication needs impacting ability to self-manage diabetes?: No    Health Literacy  Preferred Learning Method: Face to Face, Demonstration  How often do you need to have someone help you read instructions, pamphlets, or written material from your doctor or pharmacy?: Rarely  Health literacy needs impacting ability to self-manage diabetes?: No      Diabetes Self-Management Skills Assessment    Diabetes Disease Process/Treatment Options  Diabetes Disease Process/Treatment Options: Skills Assessment Completed: No  Deferred due to:: Time  Area of need?: Yes    Nutrition/Healthy Eating  Nutrition/Healthy Eating Skills Assessment Completed:: No  Deffered due to:: Time  Area of need?: Yes    Physical Activity/Exercise  Physical Activity/Exercise Skills Assessment Completed: : No  Deffered due to:: Time  Area of need?: Yes    Medications  Medication Skills Assessment Completed:: No  Deffered due to:: Time  Area of need?: Yes    Home Blood Glucose Monitoring  Patient states that blood sugar is checked at home daily.: yes  Monitoring Method:: home glucometer, personal continuous glucose monitor  How often do you  check your blood sugar?: Other (comment) (multiple times throughout the day)  When you check what is your typical blood sugar range? : 130s-200s  Blood glucose logs:: no  Blood glucose logs reviewed today?: no  Personal CGM type:: dexcom G7  Patient is able to use personal CGM appropriately.: no  CGM Report reviewed?: no  Unable to download personal CGM: starting at time of visit  Home Blood Glucose Monitoring Skills Assessment Completed: : Yes  Assessment indicates:: Knowledge deficit, Instruction Needed  Area of need?: Yes    Acute Complications  Acute Complications Skills Assessment Completed: : No  Deffered due to:: Time  Area of need?: Yes    Chronic Complications  Chronic Complications Skills Assessment Completed: : No  Deferred due to:: Time  Area of need?: Yes    Psychosocial/Coping  Psychosocial/Coping Skills Assessment Completed: : No  Deffered due to:: Time  Area of need?: Yes      Assessment Summary and Plan    Based on today's diabetes care assessment, the following areas of need were identified:          10/2/2023    12:01 AM   Social   Support No   Access to Mass Media/Tech No   Cognitive/Behavioral Health No   Culture/Yazidism No   Communication No   Health Literacy No            10/2/2023    12:01 AM   Clinical   Medication Adherence No   Lab Compliance No            10/2/2023    12:01 AM   Diabetes Self-Management Skills   Diabetes Disease Process/Treatment Options Yes, no time to review, will address at follow-up appointment   Nutrition/Healthy Eating Yes, no time to review, will address at follow-up appointment   Physical Activity/Exercise Yes, no time to review, will address at follow-up appointment   Medication Yes, no time to review, will address at follow-up appointment   Home Blood Glucose Monitoring Yes, dexcom G7 start  Dexcom G7 Education  Patient is here in clinic today for initial start of Dexcom continuous glucose monitoring system (CGMA). Reviewed with patient how to insert sensor.  "Patient inserted sensor on right upper arm. Time and date was set on monitor and settings were set. Hypoglycemia trigger set for 70 and hyperglycemia set for 250. Patient provided with phone number for 24-hour help line if needed. Discussed various types of possible alarms and what to do. Questions addressed. Initialization finished. No futher questions.  Dexcom G7 mobile colby downloaded to phone. Education was provided using "Quick Start Guide" per Dexcom protocol.     Pt will be using their phone/ as the primary .  Overview:  5min glucose reading updates, trending arrows, BG graph screens, battery life indicator, Blue Tooth Symbol.  Menus: trend Graph, start sensor, enter BG, events, Alerts, Settings, Shutdown, Stop Sensor   Settings:                          * Urgent Low: 55 mg/dL                          * Urgent Low Soon: Off                          * Low alert: 70                          * High alert: 250                          * Rise rate: Off                          * Fall Rate: Off                          * Signal Loss: 30 min                          * No Reading: Off                          * Always sound: On                             Reviewed additional setting options with patient, including Graph Height. Sensor was paired with /phone.    Reviewed where to find sensor insertion time and sensor expiration date.   Discussed no need to calibrate sensor during the entirety of the 10 day wear. Discussed that pt can calibrate sensor if desired, Reviewed appropriate calibration techniques.  Reviewed sensor site selection. Site selected and prepped using aseptic technique Inserted to right upper arm.  Practiced sensor removal from site and disposal.  Patient able to demonstrate without difficulty.  Encouraged to follow-up prior to starting another sensor.   Reviewed problem solving aspects of sensor transmission/variables that can disrupt RF transmission.  range " 20 feet, but the first 3 hrs keep within 3 feet of transmitter.  Pt instructed on lag time of interstitial fluid from CBG and was advised to tx hypoglycemia and dose insulin based on SMBG values.  Link to video provided and written instructions provided for patient to review before 10 day sensor change  Dexcom technical support contact number given and examples of when to contact them discussed.    Acute Complications Yes, no time to review, will address at follow-up appointment   Chronic Complications Yes, no time to review, will address at follow-up appointment   Psychosocial/Coping Yes, no time to review, will address at follow-up appointment          Today's interventions were provided through individual discussion, instruction, and written materials were provided.      Patient verbalized understanding of instruction and written materials.  Pt was able to return back demonstration of instructions today. Patient understood key points, needs reinforcement and further instruction.     Diabetes Self-Management Care Plan:    Today's Diabetes Self-Management Care Plan was developed with Lacey Barnes's input. Markjohn Cameron has agreed to work toward the following goal(s) to improve his/her overall diabetes control.      Care Plan: Diabetes Management   Updates made since 9/3/2023 12:00 AM        Problem: Blood Glucose Self-Monitoring         Goal: Patient agrees to check blood sugars atleast once per day using the dexcom G7 for the next 3 months.    Start Date: 10/2/2023   Expected End Date: 1/3/2024   This Visit's Progress: Deferred   Priority: High   Barriers: No Barriers Identified        Task: Provided patient with reference to understand the trend arrows and CGM value, reviewed the fact that the CGM probe is inserted into the interstitial fluid not the blood. Completed 10/3/2023        Task: Provided patient will schedule of when to change the sensor (every 10 days) Completed 10/3/2023        Task: Educated on  parameters on when to notify provider and advised patient to bring reader/ to all appts with Endocrinologist and Diabetes Care Specialist. Completed 10/3/2023        Task: Instructed on how to self-monitor blood glucose using a personal CGM, how to properly dispose of used sensor and transmitter, and how to appropriately store supplies. Completed 10/3/2023        Task: Discussed when to use FSG to for treatment rather than CGM - when there is no arrow, when symptoms don't match the reading, and when no data is available. Completed 10/3/2023        Task: Instructed on trend arrows and CGM value vs FSG value that with the arrow to the side FSG could be as much as a 20-point difference and if arrow is up or down there could be as much as a 50-point difference Completed 10/3/2023        Task: Instructed to choose events, blood glucose, then use to calibrate, add FSG value and confirm. This will help Dexcom to be more in sync with FSG values. Stressed to only calibrate if arrow is ? Completed 10/3/2023        Task: Discussed appropriate insertion sites to place the sensor (i.e. upper arm, abdomen) Completed 10/3/2023        Task: Set up Dexcom G6/G7 and Clarity apps on patient's phone, allowed provider access to Clarity reports. Completed 10/3/2023        Task: Reviewed Dexcom download with patient and provider, adjustments made to treatment plan by provider and communicated to patient as directed, patient states understanding.           Follow Up Plan     Follow up in about 10 days (around 10/12/2023) for General Follow-up, Personal CGM Upload.    Today's care plan and follow up schedule was discussed with patient.  Lacey Barnes verbalized understanding of the care plan, goals, and agrees to follow up plan.        The patient was encouraged to communicate with his/her health care provider/physician and care team regarding his/her condition(s) and treatment.  I provided the patient with my contact information  today and encouraged to contact me via phone or [facility] Patient Portal as needed.     Length of Visit   Total Time: 20 Minutes

## 2023-10-12 ENCOUNTER — DOCUMENTATION ONLY (OUTPATIENT)
Dept: SURGERY | Facility: CLINIC | Age: 53
End: 2023-10-12
Payer: COMMERCIAL

## 2023-10-17 ENCOUNTER — CLINICAL SUPPORT (OUTPATIENT)
Dept: DIABETES | Facility: CLINIC | Age: 53
End: 2023-10-17
Payer: COMMERCIAL

## 2023-10-17 ENCOUNTER — PATIENT MESSAGE (OUTPATIENT)
Dept: PODIATRY | Facility: CLINIC | Age: 53
End: 2023-10-17
Payer: COMMERCIAL

## 2023-10-17 ENCOUNTER — PATIENT MESSAGE (OUTPATIENT)
Dept: BEHAVIORAL HEALTH | Facility: CLINIC | Age: 53
End: 2023-10-17
Payer: COMMERCIAL

## 2023-10-17 ENCOUNTER — OFFICE VISIT (OUTPATIENT)
Dept: PRIMARY CARE CLINIC | Facility: CLINIC | Age: 53
End: 2023-10-17
Payer: COMMERCIAL

## 2023-10-17 VITALS
OXYGEN SATURATION: 100 % | SYSTOLIC BLOOD PRESSURE: 120 MMHG | TEMPERATURE: 98 F | WEIGHT: 164.13 LBS | HEIGHT: 66 IN | RESPIRATION RATE: 16 BRPM | DIASTOLIC BLOOD PRESSURE: 72 MMHG | HEART RATE: 81 BPM | BODY MASS INDEX: 26.38 KG/M2

## 2023-10-17 VITALS — BODY MASS INDEX: 26.38 KG/M2 | WEIGHT: 164.13 LBS | HEIGHT: 66 IN

## 2023-10-17 DIAGNOSIS — R79.89 LOW VITAMIN B12 LEVEL: ICD-10-CM

## 2023-10-17 DIAGNOSIS — I10 PRIMARY HYPERTENSION: Chronic | ICD-10-CM

## 2023-10-17 DIAGNOSIS — R79.89 LOW VITAMIN D LEVEL: ICD-10-CM

## 2023-10-17 DIAGNOSIS — E78.5 DYSLIPIDEMIA, GOAL LDL BELOW 100: ICD-10-CM

## 2023-10-17 DIAGNOSIS — E11.65 TYPE 2 DIABETES MELLITUS WITH HYPERGLYCEMIA, WITHOUT LONG-TERM CURRENT USE OF INSULIN: ICD-10-CM

## 2023-10-17 DIAGNOSIS — Z76.89 ENCOUNTER TO ESTABLISH CARE: Primary | ICD-10-CM

## 2023-10-17 DIAGNOSIS — D64.9 ANEMIA, UNSPECIFIED TYPE: ICD-10-CM

## 2023-10-17 DIAGNOSIS — E66.3 OVERWEIGHT (BMI 25.0-29.9): ICD-10-CM

## 2023-10-17 DIAGNOSIS — E11.9 TYPE 2 DIABETES MELLITUS WITHOUT COMPLICATION, WITHOUT LONG-TERM CURRENT USE OF INSULIN: ICD-10-CM

## 2023-10-17 DIAGNOSIS — F06.4 ANXIETY DISORDER DUE TO MEDICAL CONDITION: ICD-10-CM

## 2023-10-17 PROCEDURE — 3008F PR BODY MASS INDEX (BMI) DOCUMENTED: ICD-10-PCS | Mod: CPTII,S$GLB,, | Performed by: STUDENT IN AN ORGANIZED HEALTH CARE EDUCATION/TRAINING PROGRAM

## 2023-10-17 PROCEDURE — 3044F HG A1C LEVEL LT 7.0%: CPT | Mod: CPTII,S$GLB,, | Performed by: STUDENT IN AN ORGANIZED HEALTH CARE EDUCATION/TRAINING PROGRAM

## 2023-10-17 PROCEDURE — 3066F NEPHROPATHY DOC TX: CPT | Mod: CPTII,S$GLB,, | Performed by: STUDENT IN AN ORGANIZED HEALTH CARE EDUCATION/TRAINING PROGRAM

## 2023-10-17 PROCEDURE — 3061F PR NEG MICROALBUMINURIA RESULT DOCUMENTED/REVIEW: ICD-10-PCS | Mod: CPTII,S$GLB,, | Performed by: STUDENT IN AN ORGANIZED HEALTH CARE EDUCATION/TRAINING PROGRAM

## 2023-10-17 PROCEDURE — 3074F PR MOST RECENT SYSTOLIC BLOOD PRESSURE < 130 MM HG: ICD-10-PCS | Mod: CPTII,S$GLB,, | Performed by: STUDENT IN AN ORGANIZED HEALTH CARE EDUCATION/TRAINING PROGRAM

## 2023-10-17 PROCEDURE — 1160F PR REVIEW ALL MEDS BY PRESCRIBER/CLIN PHARMACIST DOCUMENTED: ICD-10-PCS | Mod: CPTII,S$GLB,, | Performed by: STUDENT IN AN ORGANIZED HEALTH CARE EDUCATION/TRAINING PROGRAM

## 2023-10-17 PROCEDURE — G0108 DIAB MANAGE TRN  PER INDIV: HCPCS | Mod: S$GLB,,, | Performed by: DIETITIAN, REGISTERED

## 2023-10-17 PROCEDURE — 99204 PR OFFICE/OUTPT VISIT, NEW, LEVL IV, 45-59 MIN: ICD-10-PCS | Mod: S$GLB,,, | Performed by: STUDENT IN AN ORGANIZED HEALTH CARE EDUCATION/TRAINING PROGRAM

## 2023-10-17 PROCEDURE — 1159F MED LIST DOCD IN RCRD: CPT | Mod: CPTII,S$GLB,, | Performed by: STUDENT IN AN ORGANIZED HEALTH CARE EDUCATION/TRAINING PROGRAM

## 2023-10-17 PROCEDURE — 99999 PR PBB SHADOW E&M-EST. PATIENT-LVL III: CPT | Mod: PBBFAC,,, | Performed by: DIETITIAN, REGISTERED

## 2023-10-17 PROCEDURE — 95249 CONT GLUC MNTR PT PROV EQP: CPT | Mod: S$GLB,,, | Performed by: DIETITIAN, REGISTERED

## 2023-10-17 PROCEDURE — 3066F PR DOCUMENTATION OF TREATMENT FOR NEPHROPATHY: ICD-10-PCS | Mod: CPTII,S$GLB,, | Performed by: STUDENT IN AN ORGANIZED HEALTH CARE EDUCATION/TRAINING PROGRAM

## 2023-10-17 PROCEDURE — 3061F NEG MICROALBUMINURIA REV: CPT | Mod: CPTII,S$GLB,, | Performed by: STUDENT IN AN ORGANIZED HEALTH CARE EDUCATION/TRAINING PROGRAM

## 2023-10-17 PROCEDURE — 3078F DIAST BP <80 MM HG: CPT | Mod: CPTII,S$GLB,, | Performed by: STUDENT IN AN ORGANIZED HEALTH CARE EDUCATION/TRAINING PROGRAM

## 2023-10-17 PROCEDURE — 99999 PR PBB SHADOW E&M-EST. PATIENT-LVL V: CPT | Mod: PBBFAC,,, | Performed by: STUDENT IN AN ORGANIZED HEALTH CARE EDUCATION/TRAINING PROGRAM

## 2023-10-17 PROCEDURE — 99999 PR PBB SHADOW E&M-EST. PATIENT-LVL III: ICD-10-PCS | Mod: PBBFAC,,, | Performed by: DIETITIAN, REGISTERED

## 2023-10-17 PROCEDURE — 3008F BODY MASS INDEX DOCD: CPT | Mod: CPTII,S$GLB,, | Performed by: STUDENT IN AN ORGANIZED HEALTH CARE EDUCATION/TRAINING PROGRAM

## 2023-10-17 PROCEDURE — 1159F PR MEDICATION LIST DOCUMENTED IN MEDICAL RECORD: ICD-10-PCS | Mod: CPTII,S$GLB,, | Performed by: STUDENT IN AN ORGANIZED HEALTH CARE EDUCATION/TRAINING PROGRAM

## 2023-10-17 PROCEDURE — 95249 PR GLUCOSE MONITORING, 72 HRS, SUB-Q SENSOR, PATIENT PROVIDED: ICD-10-PCS | Mod: S$GLB,,, | Performed by: DIETITIAN, REGISTERED

## 2023-10-17 PROCEDURE — 99999 PR PBB SHADOW E&M-EST. PATIENT-LVL V: ICD-10-PCS | Mod: PBBFAC,,, | Performed by: STUDENT IN AN ORGANIZED HEALTH CARE EDUCATION/TRAINING PROGRAM

## 2023-10-17 PROCEDURE — 99204 OFFICE O/P NEW MOD 45 MIN: CPT | Mod: S$GLB,,, | Performed by: STUDENT IN AN ORGANIZED HEALTH CARE EDUCATION/TRAINING PROGRAM

## 2023-10-17 PROCEDURE — 1160F RVW MEDS BY RX/DR IN RCRD: CPT | Mod: CPTII,S$GLB,, | Performed by: STUDENT IN AN ORGANIZED HEALTH CARE EDUCATION/TRAINING PROGRAM

## 2023-10-17 PROCEDURE — G0108 PR DIAB MANAGE TRN  PER INDIV: ICD-10-PCS | Mod: S$GLB,,, | Performed by: DIETITIAN, REGISTERED

## 2023-10-17 PROCEDURE — 3044F PR MOST RECENT HEMOGLOBIN A1C LEVEL <7.0%: ICD-10-PCS | Mod: CPTII,S$GLB,, | Performed by: STUDENT IN AN ORGANIZED HEALTH CARE EDUCATION/TRAINING PROGRAM

## 2023-10-17 PROCEDURE — 3078F PR MOST RECENT DIASTOLIC BLOOD PRESSURE < 80 MM HG: ICD-10-PCS | Mod: CPTII,S$GLB,, | Performed by: STUDENT IN AN ORGANIZED HEALTH CARE EDUCATION/TRAINING PROGRAM

## 2023-10-17 PROCEDURE — 3074F SYST BP LT 130 MM HG: CPT | Mod: CPTII,S$GLB,, | Performed by: STUDENT IN AN ORGANIZED HEALTH CARE EDUCATION/TRAINING PROGRAM

## 2023-10-17 RX ORDER — CARVEDILOL 3.12 MG/1
3.12 TABLET ORAL 2 TIMES DAILY
COMMUNITY
Start: 2023-09-28 | End: 2023-10-17

## 2023-10-17 NOTE — PATIENT INSTRUCTIONS
Est Care:   - new patient, hx of DM2, and HTN.  Showing signs of anxiety today in interview.    Anxiety disorder due to medical condition:  - Having anxiety about eating anything, feeling it is causing her BP to jump to 190s just eating a salad or eat 2 fries.  Completely out of proportion to what is physiologically realistic, much more likely anxiety/panic response.    Primary hypertension   - BP controlled today.    - has a home wrist cuff.  Will check resting with feet on the floor.     - states will see elevated at times after eating.    - patient very confused on when to take/not take BP meds.  Using PRN pending if having headaches or if home BP is wnl or elevated.   - today took Hyzaar, yesterday took none, a week ago stoped Coreg.   - would advise only having 1 med available and taking every day on regular schedule.  For now can be Losartan/HCTZ 100-25mg daily.    - stop Coreg, throw it away.  Do not use p.r.n. for high pressure or headaches as it only complicate the picture in make unclear which medications are affecting you.   - take BP once daily on log and bring to f/u appt.     Type 2 diabetes mellitus with hyperglycemia, without long-term current use of insulin   - taking Metformin 500mg once in AM, does not like the med.  Is making stomach sick.     - states does not free that Metformin is doing much for her, states is not making her stomach feel better.    - patient was advised that with a A1c of 6.8% and only taking metformin 500 mg daily, she does not need to check her blood sugar daily, checking it twice a week should be adequate.  Her frequent blood sugar checks seem to be worsening her anxiety and instead would recommend focusing on eating a healthy balanced diet with adequate vegetables and lean meats.      Dyslipidemia, goal LDL below 100   - LDL was 115 on last check, goal is under 100 in DM.   - taking statin.     Overweight (BMI 25.0-29.9)   - watching diet, BMI at 26.49.   - states has  appt with Nutrition today.    Patient's recent labs show low vitamin-D low vitamin B12.  Recommend supplementation of both.

## 2023-10-17 NOTE — PROGRESS NOTES
Subjective:           Patient ID: Lacey Lange is a 53 y.o. female who presents today with a chief complaint of [facility] Care and Annual Exam  .    Chief Complaint:   [facility] Care and Annual Exam      History of Present Illness:    Lacey Lange is a 53 y.o. female who presents today with a chief complaint of [facility] Care and Annual Exam  .    54yo female presenting to est care, hx of DM2, HTN.      Last A1c was 6.8%.  Was given a DexCom to use, patient states it came off, but needs to get an appt to f/u with her.      States that her BP was in the 190s when eating a few days ago.  Thinks that eating red beans was making her BP go up.  So is just eating turkey sanwiches and water.    States she will feel pressure go up, start getting chest pain, start sweating.  Will be after eating, then gets a limp.    States was told that her pressure has been fine at multiple appts and was stopped on her meds, but then eventually has her pressure increase on her.    Is scared of food. Worries about eating.  Fearful of hamburgers, of italian dressing.  States can't even eat salad without dressing due to it making her BP increase.     States she was about 195lb in July, now is down to 164lb.  Last A1c was on Sept 9, 2023 and at 6.8%, so may have improved more since then.    HTN: states she does not take her BP meds.    Then later during interview relates that she did take her losartan/Hctz today as she had a HA, but usually does not take med.    Yesterday did not take BP med because BP was fine on home check.    States Coreg tastes bad as she does not like the taste.    Says she does not take it, then says she ran out of it.    Then she states she just got back on the Losartan    Review of Systems   Constitutional:  Negative for activity change, fatigue, fever and unexpected weight change.   HENT:  Negative for congestion, nosebleeds, sinus pressure and sneezing.    Respiratory:  Positive for chest tightness  "and shortness of breath. Negative for cough and wheezing.    Cardiovascular:  Negative for chest pain, palpitations and leg swelling.   Gastrointestinal:  Positive for abdominal pain and diarrhea. Negative for abdominal distention, constipation and nausea.   Genitourinary:  Negative for difficulty urinating and dysuria.   Musculoskeletal:  Negative for back pain and gait problem.   Skin:  Negative for pallor and rash.   Neurological:  Negative for weakness, numbness and headaches.   Psychiatric/Behavioral:  Positive for sleep disturbance. Negative for agitation. The patient is nervous/anxious.            Objective:        Vitals:    10/17/23 1146   BP: 120/72   BP Location: Right arm   Patient Position: Sitting   BP Method: Medium (Manual)   Pulse: 81   Resp: 16   Temp: 97.6 °F (36.4 °C)   TempSrc: Temporal   SpO2: 100%   Weight: 74.5 kg (164 lb 2.1 oz)   Height: 5' 6" (1.676 m)       Body mass index is 26.49 kg/m².      Physical Exam  Vitals reviewed.   Constitutional:       General: She is not in acute distress.     Appearance: Normal appearance. She is not ill-appearing.   HENT:      Head: Normocephalic and atraumatic.      Right Ear: External ear normal.      Left Ear: External ear normal.      Nose: No rhinorrhea.      Mouth/Throat:      Mouth: Mucous membranes are moist.   Eyes:      Extraocular Movements: Extraocular movements intact.      Conjunctiva/sclera: Conjunctivae normal.   Cardiovascular:      Rate and Rhythm: Normal rate and regular rhythm.      Pulses: Normal pulses.      Heart sounds: No murmur heard.  Pulmonary:      Effort: Pulmonary effort is normal. No respiratory distress.      Breath sounds: No wheezing.   Musculoskeletal:      Right lower leg: No edema.      Left lower leg: No edema.   Skin:     Coloration: Skin is not jaundiced.      Findings: No bruising.   Neurological:      General: No focal deficit present.      Mental Status: She is alert and oriented to person, place, and time.      " Motor: No weakness.      Gait: Gait normal.   Psychiatric:      Comments: Patient showing signs of anxiety, legs bouncing on exam.   Voicing concern about HTN, DM and abd pain after eating.                Lab Results   Component Value Date     09/09/2023    K 3.6 09/09/2023     09/09/2023    CO2 19 (L) 09/09/2023    BUN 19 09/09/2023    CREATININE 1.0 09/09/2023    ANIONGAP 15 09/09/2023     Lab Results   Component Value Date    HGBA1C 6.8 (H) 09/09/2023     Lab Results   Component Value Date    BNP 17 08/26/2023       Lab Results   Component Value Date    WBC 4.97 09/09/2023    HGB 10.0 (L) 09/09/2023    HCT 32.9 (L) 09/09/2023     09/09/2023    GRAN 3.0 09/09/2023    GRAN 59.4 09/09/2023     Lab Results   Component Value Date    CHOL 165 09/09/2023    HDL 39 (L) 09/09/2023    LDLCALC 115.2 09/09/2023    TRIG 54 09/09/2023          Current Outpatient Medications:     blood-glucose sensor (DEXCOM G7 SENSOR) Nan, 1 Device by Misc.(Non-Drug; Combo Route) route every 10 days., Disp: 3 each, Rfl: 11    losartan-hydrochlorothiazide 100-25 mg (HYZAAR) 100-25 mg per tablet, Take 1 tablet by mouth once daily., Disp: 90 tablet, Rfl: 3    metFORMIN (GLUCOPHAGE-XR) 500 MG ER 24hr tablet, Take 1 tablet (500 mg total) by mouth 2 (two) times daily with meals., Disp: 180 tablet, Rfl: 1    ONETOUCH DELICA PLUS LANCET 33 gauge Misc, 1 lancet  by Other route 3 (three) times daily., Disp: 100 each, Rfl: 11    ONETOUCH ULTRA TEST Strp, 1 strip by Other route 3 (three) times daily., Disp: 100 each, Rfl: 11    simvastatin (ZOCOR) 10 MG tablet, Take 1 tablet (10 mg total) by mouth every evening., Disp: 90 tablet, Rfl: 3     Outpatient Encounter Medications as of 10/17/2023   Medication Sig Dispense Refill    blood-glucose sensor (DEXCOM G7 SENSOR) Nan 1 Device by Misc.(Non-Drug; Combo Route) route every 10 days. 3 each 11    losartan-hydrochlorothiazide 100-25 mg (HYZAAR) 100-25 mg per tablet Take 1 tablet by mouth  once daily. 90 tablet 3    metFORMIN (GLUCOPHAGE-XR) 500 MG ER 24hr tablet Take 1 tablet (500 mg total) by mouth 2 (two) times daily with meals. 180 tablet 1    ONETOUCH DELICA PLUS LANCET 33 gauge Misc 1 lancet  by Other route 3 (three) times daily. 100 each 11    ONETOUCH ULTRA TEST Strp 1 strip by Other route 3 (three) times daily. 100 each 11    simvastatin (ZOCOR) 10 MG tablet Take 1 tablet (10 mg total) by mouth every evening. 90 tablet 3    [DISCONTINUED] carvediloL (COREG) 3.125 MG tablet Take 3.125 mg by mouth 2 (two) times daily.      [DISCONTINUED] estradioL (ESTRACE) 0.01 % (0.1 mg/gram) vaginal cream Place 1 g vaginally once daily. 30 g 3     No facility-administered encounter medications on file as of 10/17/2023.          Assessment:       1. Encounter to establish care    2. Anxiety disorder due to medical condition    3. Primary hypertension    4. Type 2 diabetes mellitus with hyperglycemia, without long-term current use of insulin    5. Dyslipidemia, goal LDL below 100    6. Overweight (BMI 25.0-29.9)    7. Anemia, unspecified type    8. Low vitamin D level    9. Low vitamin B12 level           Plan:       Encounter to establish care    Anxiety disorder due to medical condition  -     Ambulatory referral/consult to Primary Care Behavioral Health (Non-Opioids); Future; Expected date: 10/24/2023    Primary hypertension    Type 2 diabetes mellitus with hyperglycemia, without long-term current use of insulin    Dyslipidemia, goal LDL below 100    Overweight (BMI 25.0-29.9)    Anemia, unspecified type    Low vitamin D level    Low vitamin B12 level                     Est Care:   - new patient, hx of DM2, and HTN.  Showing signs of anxiety today in interview.    Anxiety disorder due to medical condition:  - Having anxiety about eating anything, feeling it is causing her BP to jump to 190s just eating a salad or eat 2 fries.  Completely out of proportion to what is physiologically realistic, much more  likely anxiety/panic response.  - reporting she is only able to eat turkey sandwiches and drink water, though on leaving room, was noted patient had a large open box of triscuts next to her.  Advised to speak with nutrition about his diet and try to get adequate veggies and lean meats or other proteins.     Primary hypertension   - BP controlled today.    - has a home wrist cuff.  Will check resting with feet on the floor.     - states will see elevated at times after eating.    - patient very confused on when to take/not take BP meds.  Using PRN pending if having headaches or if home BP is wnl or elevated.   - today took Hyzaar, yesterday took none, a week ago stoped Coreg.   - would advise only having 1 med available and taking every day on regular schedule.  For now can be Losartan/HCTZ 100-25mg daily.    - stop Coreg, throw it away.  Do not use p.r.n. for high pressure or headaches as it only complicate the picture in make unclear which medications are affecting you.   - take BP once daily on log and bring to f/u appt.     Type 2 diabetes mellitus with hyperglycemia, without long-term current use of insulin   - taking Metformin 500mg once in AM, does not like the med.  Is making stomach sick.     - states does not free that Metformin is doing much for her, states is not making her stomach feel better.    - patient was advised that with a A1c of 6.8% and only taking metformin 500 mg daily, she does not need to check her blood sugar daily, checking it twice a week should be adequate.  Her frequent blood sugar checks seem to be worsening her anxiety and instead would recommend focusing on eating a healthy balanced diet with adequate vegetables and lean meats.      Dyslipidemia, goal LDL below 100   - LDL was 115 on last check, goal is under 100 in DM.   - taking statin.     Overweight (BMI 25.0-29.9)   - watching diet, BMI at 26.49.   - states has appt with Nutrition today.    Patient's recent labs show low  vitamin-D low vitamin B12.  Recommend supplementation of both.

## 2023-10-18 ENCOUNTER — PATIENT MESSAGE (OUTPATIENT)
Dept: CARDIOLOGY | Facility: CLINIC | Age: 53
End: 2023-10-18
Payer: COMMERCIAL

## 2023-10-18 NOTE — PROGRESS NOTES
"Diabetes Care Specialist Follow-up Note  Author: Cheri Horton RD, CDE  Date: 10/18/2023    Program Intake  Reason for Diabetes Program Visit:: Intervention  Type of Intervention:: Individual  Individual: Education  Education: Nutrition and Meal Planning, Self-Management Skill Review  Current diabetes risk level:: low (HgbA1c 6.8)  In the last 12 months, have you:: none  Permission to speak with others about care:: no    Lab Results   Component Value Date    HGBA1C 6.8 (H) 09/09/2023     A1c Pre Diabetes Care Specialist Intervention:  6.8%    Clinical    Weight: 74.4 kg (164 lb 2.1 oz)   Height: 5' 6" (167.6 cm)   Body mass index is 26.49 kg/m².     Problem Review  Reviewed health maintenance: yes     Medication Information  Medication adherence impacting ability to self-manage diabetes?: No    Labs  Lab Compliance Barriers: No    Nutritional Status  Diet: Diabetic diet, Low cholesterol, Low sodium, Low fat, High fiber  Meal Plan 24 Hour Recall: Breakfast, Lunch, Dinner, Snack  Meal Plan 24 Hour Recall - Breakfast: tried cheerios with milk and fruit, then eggs and beef sausage (feels like both affect her negatively for BS and BP)  Meal Plan 24 Hour Recall - Lunch: 1/2 turkey sandwich on sliced bread, tried a salad with turkey and light Italian dressing but it affected BP  Meal Plan 24 Hour Recall - Dinner: 1/2 turkey sandwich on sliced bread  Meal Plan 24 Hour Recall - Snack: none  Change in appetite?: No  Dentation:: Intact  Recent Changes in Weight: Weight Loss  Was weight loss intentional or unintentional?: Intentional  Current nutritional status an area of need that is impacting patient's ability to self-manage diabetes?: No  Afraid to eat anything because it will increase BS and/or BP  Lost 30 lbs being afraid to eat  Feeling a little lightheaded at times  With some foods feels like chest/heart is coming up, especially when active    Physical activity/Exercise:   Needs to increase activity levels    SMBG: " using the dexcom G7  Report scanned into media  Report reviewed with provider and patient  \                    Additional Social History    Support  Is Support an area impacting ability to self-manage diabetes?: No    Access to Mass Media & Technology  Media or technology needs impacting ability to self-manage diabetes?: No    Cognitive/Behavioral Health  Cognitive or behavioral barriers impacting ability to self-manage diabetes?: No    Culture/Gnosticist  Culture or Uatsdin beliefs that may impact ability to access healthcare: No    Communication  Language preference: English  Communication needs impacting ability to self-manage diabetes?: No    Health Literacy  Health literacy needs impacting ability to self-manage diabetes?: No      Diabetes Self-Management Skills Assessment     Diabetes Disease Process/Treatment Options  Patient/caregiver able to state what happens when someone has diabetes.: yes  Patient/caregiver knows what type of diabetes they have.: yes  Diabetes Type : Type II  Patient able to identify at least three risk factors for diabetes.: yes  Identified risk factors:: history of gestational diabetes, family history  Diabetes Disease Process/Treatment Options: Skills Assessment Completed: Yes  Assessment indicates:: Adequate understanding  Area of need?: Yes    Nutrition/Healthy Eating  Method of carbohydrate measurement:: other (see comments) (eliminating lots of foods)  Patient can identify foods that impact blood sugar.: yes  Patient-identified foods:: sweets, starches (bread, pasta, rice, cereal)  Nutrition/Healthy Eating Skills Assessment Completed:: Yes  Assessment indicates:: Instruction Needed  Area of need?: Yes    Physical Activity/Exercise  Patient's daily activity level:: lightly active  Patient formally exercises outside of work.: no  Reasons for not exercising:: work schedule  Patient can identify forms of physical activity.: yes  Stated forms of physical activity:: other (see  comments) (walking, going to the gym, weight training)  Patient can identify reasons why exercise/physical activity is important in diabetes management.: no  Physical Activity/Exercise Skills Assessment Completed: : Yes  Assessment indicates:: Instruction Needed, Adequate understanding  Area of need?: Yes    Medications  Patient is able to describe current diabetes management routine.: yes  Diabetes management routine:: diet, oral medications (metformin)  Patient is able to identify current diabetes medications, dosages, and appropriate timing of medications.: yes  Patient understands the purpose of the medications taken for diabetes.: no  Patient reports problems or concerns with current medication regimen.: no  Medication Skills Assessment Completed:: Yes  Assessment indicates:: Instruction Needed  Area of need?: Yes    Home Blood Glucose Monitoring  Patient states that blood sugar is checked at home daily.: yes  Monitoring Method:: personal continuous glucose monitor  Personal CGM type:: dexcom G7  Patient is able to use personal CGM appropriately.: yes  CGM Report reviewed?: yes  Home Blood Glucose Monitoring Skills Assessment Completed: : Yes  Assessment indicates:: Adequate understanding  Area of need?: No    Acute Complications  Patient is able to identify types of acute complications: Yes  Patient Identified:: Hypoglycemia, Hyperglycemia  Patient is able to state the basic meaning of hypoglycemia?: Yes  Able to state the blood sugar range for hypoglycemia?: no (see comments)  Patient can identify general symptoms of hypoglycemia: no (see comments)  Able to state proper treatment of hypoglycemia?: no (see comments)  Patient is able to state the basic meaning of hyperglycemia?: Yes  Able to state the blood sugar range for hyperglycemia?: no (see comments)  Patient able to state proper treatment of hyperglycemia?: no (see comments)  Patient able to verbalize sick day plan?: no  Acute Complications Skills  Assessment Completed: : Yes  Assessment indicates:: Instruction Needed  Area of need?: Yes    Chronic Complications  Patient can identify major chronic complications of diabetes.: no  Patient can identify ways to prevent or delay diabetes complications.: yes  Stated ways to prevent complications:: controlling blood sugar  Patient is aware that having diabetes increases risk of heart disease?: No  Patient is aware that heart disease is the leading cause of death and disability in people with diabetes?: No  Patient able to state risk factors for heart disease?: Yes  Patient stated risk factors for heart disease:: High blood pressure, High cholesterol  Patient is taking statin?: Yes (zocor)  Chronic Complications Skills Assessment Completed: : Yes  Assessment indicates:: Instruction Needed  Area of need?: Yes    Psychosocial/Coping  Patient can identify ways of coping with chronic disease.: yes  Patient-stated ways of coping with chronic disease:: support from loved ones, counseling/actively seeing behavioral professional, support group  Psychosocial/Coping Skills Assessment Completed: : Yes  Assessment indicates:: Instruction Needed  Area of need?: Yes      During today's follow-up visit,  the following areas required further assessment and content was provided/reviewed.    Based on today's diabetes care assessment, the following areas of need were identified:          10/17/2023    12:01 AM   Social   Support No   Access to Mass Media/Tech No   Cognitive/Behavioral Health No   Culture/Sabianism No   Communication No   Health Literacy No            10/17/2023    12:01 AM   Clinical   Medication Adherence No   Lab Compliance No   Nutritional Status No            10/17/2023    12:01 AM   Diabetes Self-Management Skills   Diabetes Disease Process/Treatment Options Yes, reviewed briefly. Patient educated on what is DM, T1DM, T2DM, risk factors, managing DM  Reviewed pathophysiology of type 2 diabetes, complications related  to the disease, importance of annual dilated eye exam, and daily foot exam.     Nutrition/Healthy Eating Yes, in depth education on meal planning, Reviewed carb counting, portion control, importance of spacing meals throughout the day to prevent post prandial elevations.  Recommended low saturated fat, low sodium diet to aid in control of hypertension and cholesterol. Reviewed plate method and portion control, dining out tips, meal planning, reading a food label, healthy snack options, benefits of physical activity     Physical Activity/Exercise Yes, briefly reviewed the following  Physical Activity Suggestions:   Dog walking,   House cleaning,   Dancing   Yard work/gardening   Swimming,   Brisk walking in neighborhood or at local mall,   Gym Membership/Silver Sneakers Program,   Community Resources/[employer]/Sticky Centers    Reasons exercise/physical activity are important to diabetes management:  Helps insulin work better  Improves blood circulation  Keeps body and joints flexible  Lowers blood glucose, blood pressure, and cholesterol  Lowers risk of heart disease and stroke  Relieves stress  Strengthens heart, muscles, and bones  Tones muscles     Medication Yes, reviewed metformin  Discussed MOA, onset, side effects, dosage of metformin.  Provided with strategies for daily medication adherence (phone alarms, medication reminder apps, medication list, pill organizer, pill packing, pharmacy delivery options).   Discussed any concerns about regimen.   Reviewed significance of mealtimes and medication administration.   Answered patient's questions regarding if he will ever be able to get off medication.  Reviewed need for medication and challenges of glucose control with steroid treatments and stress.  The patient was instructed on precautions related to hyper/hypoglycemia.   Patient was given instructions on when/who to call with problems.      Home Blood Glucose Monitoring No   Acute Complications Yes,  reviewed briefly, need to re-address at follow-up  Hyperglycemia  ABC's of Diabetes   Discussed importance of A1c less than 6.0 to reduce risk of micro and macro complications, controlled Blood Pressure 130/80 and Cholesterol Lab Values--Total Cholesterol <200mg, LDL < 100, HDL >45 men and >50 women, Triglycerides <150mg for prevention heart disease, heart attack, stroke.  how to use a glucometer  reviewed understanding diabetes distress  reviewed current level and goal level for HgbA1c, blood glucose, microalbumin, and lipids  reviewed signs/symptoms of hyperglycemia  reviewed what level blood sugar is considered high   reviewed at what level to contact the doctor and/or go to the emergency room.   Reviewed what patient can do to decrease blood sugar (ie drink more water, exercise, take medication as prescribed, monitor blood glucose)     Hypoglycemia  Reviewed blood glucose goals, prevention, detection, and treatment of hypoglycemia, and when to contact the clinic.  reviewed signs/symptoms of hypoglycemia  reviewed what level blood sugar is considered low   reviewed at what level to contact the doctor and/or go to the emergency room.   Reviewed what patient can do to increase blood sugar (ie drink juice or ½ can soda, eat 5-6 pieces of candy, 4 glucose tablets, 1 tbsp sugar or honey, glucagon)  Reviewed when glucagon should be used  Reviewed how to use glucagon device (injections and inhaled)     Chronic Complications Yes, reviewed briefly, need to re-address at follow-up  Diabetes Care Schedule   A1c every 3 to 6 months  Lipid Panel every year  Microalbumin (urine test) once per year  Comprehensive Foot Exam once per year  Dilated Eye Exam at least once per year  Dental exam every 6 months  Flu Vaccination yearly   Shingles and Pneumonia Vaccination as recommended by physician      Reviewed diabetes care schedule, foot care guidelines, diabetes and retinopathy screening, s/s hypo and hyperglycemia, long/short  term complication of uncontrolled DM, importance of compliance with treatment plan    Reviewed risk of heart disease  High blood pressure  High cholesterol  Diet  Limited activity  Medication non-adherence  Having diabetes    Reviewed that heart disease is the leading cause of death in people with uncontrolled diabetes  Reviewed ways to prevent complications:  Avoid smoking and other types of tobacco  Checking feet daily and having routine comprehensive foot exams  Controlling blood sugar  Controlling cholesterol and triglycerides  Having regular diabetic eye exams  Healthy eating and regular activity  Maintaining optimal blood glucose control     Psychosocial/Coping Yes, reviewed briefly, need to re-address at follow-up  Covered the following coping strategies to help deal with chronic disease.   Provided current list of diabetes support group meeting locations and contact information, written online and community resources   Discussed role stress on blood sugar control and diabetes health   Discussed role sleep on health and diabetes control  Discussed feelings associated with chronic disease management  Consider talking with family or close friend  Consider talking with certified counselor, psychologist or psychiatrist  Recommended ways to reduce stress such as: reading a book or magazine, enjoy a cup of tea or coffee, take a walk, journal, draw or color, paint, play an instrument, singing, gardening, crafting, woodwork, prayer, meditation, yoga or phi chi, develop a sleep schedule          Today's interventions were provided through individual discussion, instruction, and written materials were provided.    Patient verbalized understanding of instruction and written materials.  Pt was able to return back demonstration of instructions today. Patient understood key points, needs reinforcement and further instruction.     Diabetes Self-Management Care Plan Review and Evaluation of Progress:    During today's  follow-up Lacey Barnes's Diabetes Self-Management Care Plan progress was reviewed and progress was evaluated including his/her input. Lacey Barnes has agreed to continue his/her journey to improve/maintain overall diabetes control by continuing to set health goals. See care plan progress below.      Care Plan: Diabetes Management   Updates made since 9/18/2023 12:00 AM        Problem: Blood Glucose Self-Monitoring         Goal: Patient agrees to check blood sugars atleast once per day using the dexcom G7 for the next 3 months.    Start Date: 10/2/2023   Expected End Date: 1/3/2024   This Visit's Progress: On track   Recent Progress: Deferred   Priority: High   Barriers: No Barriers Identified        Task: Provided patient with reference to understand the trend arrows and CGM value, reviewed the fact that the CGM probe is inserted into the interstitial fluid not the blood. Completed 10/3/2023        Task: Provided patient will schedule of when to change the sensor (every 10 days) Completed 10/3/2023        Task: Educated on parameters on when to notify provider and advised patient to bring reader/ to all appts with Endocrinologist and Diabetes Care Specialist. Completed 10/3/2023        Task: Instructed on how to self-monitor blood glucose using a personal CGM, how to properly dispose of used sensor and transmitter, and how to appropriately store supplies. Completed 10/3/2023        Task: Discussed when to use FSG to for treatment rather than CGM - when there is no arrow, when symptoms don't match the reading, and when no data is available. Completed 10/3/2023        Task: Instructed on trend arrows and CGM value vs FSG value that with the arrow to the side FSG could be as much as a 20-point difference and if arrow is up or down there could be as much as a 50-point difference Completed 10/3/2023        Task: Instructed to choose events, blood glucose, then use to calibrate, add FSG value and confirm. This  will help Dexcom to be more in sync with FSG values. Stressed to only calibrate if arrow is ? Completed 10/3/2023        Task: Discussed appropriate insertion sites to place the sensor (i.e. upper arm, abdomen) Completed 10/3/2023        Task: Set up Dexcom G6/G7 and Clarity apps on patient's phone, allowed provider access to Clarity reports. Completed 10/3/2023        Task: Reviewed Dexcom download with patient and provider, adjustments made to treatment plan by provider and communicated to patient as directed, patient states understanding. Completed 10/18/2023          Follow Up Plan     Follow up in about 4 weeks (around 11/14/2023) for General Follow-up, Personal CGM Upload.    Today's care plan and follow up schedule was discussed with patient.  Lacey Barnes verbalized understanding of the care plan, goals, and agrees to follow up plan.        The patient was encouraged to communicate with his/her health care provider/physician and care team regarding his/her condition(s) and treatment.  I provided the patient with my contact information today and encouraged to contact me via phone or [facility] Patient Portal as needed.     Length of Visit   Total Time: 75 Minutes

## 2023-10-20 ENCOUNTER — PATIENT MESSAGE (OUTPATIENT)
Dept: BEHAVIORAL HEALTH | Facility: CLINIC | Age: 53
End: 2023-10-20
Payer: COMMERCIAL

## 2023-10-20 ENCOUNTER — TELEPHONE (OUTPATIENT)
Dept: BEHAVIORAL HEALTH | Facility: CLINIC | Age: 53
End: 2023-10-20
Payer: COMMERCIAL

## 2023-10-20 NOTE — PROGRESS NOTES
Contacted patient no answer left VM.  Sent portal message to [facility] call to scheduled [facility] new patient appointment.

## 2023-10-23 ENCOUNTER — TELEPHONE (OUTPATIENT)
Dept: BEHAVIORAL HEALTH | Facility: CLINIC | Age: 53
End: 2023-10-23
Payer: COMMERCIAL

## 2023-10-23 ENCOUNTER — PATIENT MESSAGE (OUTPATIENT)
Dept: BEHAVIORAL HEALTH | Facility: CLINIC | Age: 53
End: 2023-10-23
Payer: COMMERCIAL

## 2023-10-23 NOTE — PROGRESS NOTES
Contacted patient no answer left VM.  Sent patient portal message to pls call or have PCP to refer her again.

## 2023-10-24 ENCOUNTER — PATIENT MESSAGE (OUTPATIENT)
Dept: BEHAVIORAL HEALTH | Facility: CLINIC | Age: 53
End: 2023-10-24
Payer: COMMERCIAL

## 2023-10-24 ENCOUNTER — TELEPHONE (OUTPATIENT)
Dept: BEHAVIORAL HEALTH | Facility: CLINIC | Age: 53
End: 2023-10-24
Payer: COMMERCIAL

## 2023-10-24 DIAGNOSIS — E11.65 TYPE 2 DIABETES MELLITUS WITH HYPERGLYCEMIA, WITHOUT LONG-TERM CURRENT USE OF INSULIN: ICD-10-CM

## 2023-10-24 RX ORDER — BLOOD SUGAR DIAGNOSTIC
1 STRIP MISCELLANEOUS 3 TIMES DAILY
Qty: 100 EACH | Refills: 11 | Status: SHIPPED | OUTPATIENT
Start: 2023-10-24 | End: 2023-10-31 | Stop reason: SDUPTHER

## 2023-10-24 NOTE — PROGRESS NOTES
Behavioral Health Community Health Worker  Initial Assessment  Completed by:  Madina Rosenthal     Date:  10/24/2023    Patient Enrollment in Behavioral Health Program:  Patient verbalized understanding of Behavioral Health Integration services to include:  Patient understands that CHW, LCSW, PharmD and consulting Psychiatrist are members of the care team working collaboratively with his/her primary care provider: Yes  Patient understands that activation of their [facility] patient portal account is required for accessing the full scope of team services: Yes  Patient understands that some counseling sessions may occur via video: Yes  Clinic visits with the psychiatrist may be subject to a co-pay based on your insurance: Yes    Assessments     Single Item Health Literacy Scale:  How often do you need to have someone help you read instructions, pamphlets or other written material from your doctor or pharmacy?: Never    Promis 10:  Promis 10 Responses  In general, would you say your health is: Good  In general, would you say your quality of life is: Good  In general, how would you rate your physical health?: Good  In general, how would you rate your mental health, including your mood and your ability to think?: Good  In general, how would you rate your satisfaction with your social activities and relationships?: Fair  In general, please rate how well you carry out your usual social activities and roles. (This includes activities at home, at work and in your community, and responsibilities as a parent, child, spouse, employee, friend, etc.): Fair  To what extent are you able to carry out your everyday physical activities such as walking, climbing stairs, carrying groceries, or moving a chair? : Mostly  How often have you been bothered by emotional problems such as feeling anxious, depressed or irritable?: Often  In the past 7 days, how would you rate your fatigue on average?: Moderate  In the past 7 days, on a scale of 0 to  "10 (where 0 is no pain and 10 is the worst pain imaginable) how would you rate your pain on average?: 5  Global Physical Health: 15  Global Mental health Score: 12    Depression PHQ:      10/24/2023    12:43 PM   PHQ9   Little interest or pleasure in doing things 1   Feeling down, depressed, or hopeless 1   Trouble falling or staying asleep, or sleeping too much 3   Feeling tired or having little energy 3   Poor appetite or overeating 1   Feeling bad about yourself - or that you are a failure or have let yourself or your family down 1   Trouble concentrating on things, such as reading the newspaper or watching television 1   Moving or speaking so slowly that other people could have noticed. Or the opposite - being so fidgety or restless that you have been moving around a lot more than usual 1   PHQ-9 Total Score 12         Generalized Anxiety Disorder 7-Item Scale:      10/24/2023    12:44 PM   GAD7   1. Feeling nervous, anxious, or on edge? 1   2. Not being able to stop or control worrying? 3   3. Worrying too much about different things? 1   4. Trouble relaxing? 1   5. Being so restless that it is hard to sit still? 2   6. Becoming easily annoyed or irritable? 1   7. Feeling afraid as if something awful might happen? 1   PUNEET-7 Score 10       History     Social History     Socioeconomic History    Marital status:    Tobacco Use    Smoking status: Never    Smokeless tobacco: Never   Substance and Sexual Activity    Alcohol use: No    Drug use: No    Sexual activity: Yes     Partners: Male       Call Summary     Patient was referred to the BHI (Non-opioid) program by Primary Care Provider, Dr. Josh Flores.  W contacted Lacey Lange who reports depression and anxiety that limits her activities of daily living (ADLs).   Patient scored "12" on the PHQ8 and "10" on the PUNEET 7. Based on these scores patient is eligible for the Behavioral Health Integration (Non-opioid) Program. CHW completed the " intake and scheduled an in-person appointment for patient with Valerio Dunbar LPC on tomorrow, 10/25/2023 at 8:00am at [facility], [address], [address], [place] [address].
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
## 2023-10-25 ENCOUNTER — PATIENT MESSAGE (OUTPATIENT)
Dept: BEHAVIORAL HEALTH | Facility: CLINIC | Age: 53
End: 2023-10-25
Payer: COMMERCIAL

## 2023-10-25 ENCOUNTER — OFFICE VISIT (OUTPATIENT)
Dept: BEHAVIORAL HEALTH | Facility: CLINIC | Age: 53
End: 2023-10-25
Payer: COMMERCIAL

## 2023-10-25 DIAGNOSIS — F41.9 ANXIETY DISORDER, UNSPECIFIED TYPE: Primary | ICD-10-CM

## 2023-10-25 DIAGNOSIS — F06.4 ANXIETY DISORDER DUE TO MEDICAL CONDITION: ICD-10-CM

## 2023-10-25 PROCEDURE — 3044F PR MOST RECENT HEMOGLOBIN A1C LEVEL <7.0%: ICD-10-PCS | Mod: CPTII,S$GLB,, | Performed by: COUNSELOR

## 2023-10-25 PROCEDURE — 3044F HG A1C LEVEL LT 7.0%: CPT | Mod: CPTII,S$GLB,, | Performed by: COUNSELOR

## 2023-10-25 PROCEDURE — 90834 PSYTX W PT 45 MINUTES: CPT | Mod: S$GLB,,, | Performed by: COUNSELOR

## 2023-10-25 PROCEDURE — 99999 PR PBB SHADOW E&M-EST. PATIENT-LVL I: ICD-10-PCS | Mod: PBBFAC,,, | Performed by: COUNSELOR

## 2023-10-25 PROCEDURE — 3061F NEG MICROALBUMINURIA REV: CPT | Mod: CPTII,S$GLB,, | Performed by: COUNSELOR

## 2023-10-25 PROCEDURE — 3061F PR NEG MICROALBUMINURIA RESULT DOCUMENTED/REVIEW: ICD-10-PCS | Mod: CPTII,S$GLB,, | Performed by: COUNSELOR

## 2023-10-25 PROCEDURE — 90834 PR PSYCHOTHERAPY W/PATIENT, 45 MIN: ICD-10-PCS | Mod: S$GLB,,, | Performed by: COUNSELOR

## 2023-10-25 PROCEDURE — 99999 PR PBB SHADOW E&M-EST. PATIENT-LVL I: CPT | Mod: PBBFAC,,, | Performed by: COUNSELOR

## 2023-10-25 PROCEDURE — 3066F NEPHROPATHY DOC TX: CPT | Mod: CPTII,S$GLB,, | Performed by: COUNSELOR

## 2023-10-25 PROCEDURE — 3066F PR DOCUMENTATION OF TREATMENT FOR NEPHROPATHY: ICD-10-PCS | Mod: CPTII,S$GLB,, | Performed by: COUNSELOR

## 2023-10-31 ENCOUNTER — TELEPHONE (OUTPATIENT)
Dept: DIABETES | Facility: CLINIC | Age: 53
End: 2023-10-31
Payer: COMMERCIAL

## 2023-10-31 ENCOUNTER — TELEPHONE (OUTPATIENT)
Dept: PRIMARY CARE CLINIC | Facility: CLINIC | Age: 53
End: 2023-10-31
Payer: COMMERCIAL

## 2023-10-31 DIAGNOSIS — E11.65 TYPE 2 DIABETES MELLITUS WITH HYPERGLYCEMIA, WITHOUT LONG-TERM CURRENT USE OF INSULIN: ICD-10-CM

## 2023-10-31 RX ORDER — BLOOD SUGAR DIAGNOSTIC
1 STRIP MISCELLANEOUS 3 TIMES DAILY
Qty: 100 EACH | Refills: 11 | Status: SHIPPED | OUTPATIENT
Start: 2023-10-31 | End: 2023-10-31 | Stop reason: SDUPTHER

## 2023-10-31 NOTE — TELEPHONE ENCOUNTER
----- Message from Adelaida Gallo sent at 10/31/2023 10:22 AM CDT -----  Contact: [phone]  the patient is calling to get scheduled for a appt.  Pt access tried but no appts are available.  the patient can be reached at.   [phone]

## 2023-10-31 NOTE — TELEPHONE ENCOUNTER
----- Message from Yisel Bianchi sent at 10/31/2023  8:35 AM CDT -----  Contact: pt @ [phone]  Rx Refill/Request    Is this a Refill or New Rx:  refill    Rx Name and Strength:  ONETOUCH ULTRA TEST Strp    Preferred Pharmacy with phone number:[id number] [address] AT [address] [address]  Phone: [phone] Fax: [phone]      Communication Preference:Asking for call back pt is out of strips and think her sugar is high but can't check it without the strips      Additional Information

## 2023-10-31 NOTE — TELEPHONE ENCOUNTER
----- Message from Adelaida Gallo sent at 10/31/2023  9:13 AM CDT -----  Contact: [phone]  The patient would like a call back at your earliest convince.about dexcom 7 to be sent to [facility] #[id number] - [address] AT [address] [address]  Phone: [phone] Fax: [phone]      The pt can be reached at [phone]

## 2023-11-01 RX ORDER — BLOOD SUGAR DIAGNOSTIC
1 STRIP MISCELLANEOUS 3 TIMES DAILY
Qty: 100 EACH | Refills: 11 | Status: SHIPPED | OUTPATIENT
Start: 2023-11-01 | End: 2023-11-26 | Stop reason: SDUPTHER

## 2023-11-08 ENCOUNTER — OFFICE VISIT (OUTPATIENT)
Dept: PRIMARY CARE CLINIC | Facility: CLINIC | Age: 53
End: 2023-11-08
Payer: COMMERCIAL

## 2023-11-08 VITALS
HEART RATE: 85 BPM | WEIGHT: 160.38 LBS | HEIGHT: 66 IN | OXYGEN SATURATION: 99 % | DIASTOLIC BLOOD PRESSURE: 58 MMHG | TEMPERATURE: 98 F | SYSTOLIC BLOOD PRESSURE: 104 MMHG | RESPIRATION RATE: 16 BRPM | BODY MASS INDEX: 25.78 KG/M2

## 2023-11-08 DIAGNOSIS — I10 PRIMARY HYPERTENSION: Chronic | ICD-10-CM

## 2023-11-08 DIAGNOSIS — E78.5 DYSLIPIDEMIA, GOAL LDL BELOW 100: ICD-10-CM

## 2023-11-08 DIAGNOSIS — E11.65 TYPE 2 DIABETES MELLITUS WITH HYPERGLYCEMIA, WITHOUT LONG-TERM CURRENT USE OF INSULIN: Primary | ICD-10-CM

## 2023-11-08 DIAGNOSIS — E66.3 OVERWEIGHT (BMI 25.0-29.9): ICD-10-CM

## 2023-11-08 DIAGNOSIS — F06.4 ANXIETY DISORDER DUE TO MEDICAL CONDITION: ICD-10-CM

## 2023-11-08 PROCEDURE — 3074F SYST BP LT 130 MM HG: CPT | Mod: CPTII,S$GLB,, | Performed by: STUDENT IN AN ORGANIZED HEALTH CARE EDUCATION/TRAINING PROGRAM

## 2023-11-08 PROCEDURE — 1159F MED LIST DOCD IN RCRD: CPT | Mod: CPTII,S$GLB,, | Performed by: STUDENT IN AN ORGANIZED HEALTH CARE EDUCATION/TRAINING PROGRAM

## 2023-11-08 PROCEDURE — 99214 PR OFFICE/OUTPT VISIT, EST, LEVL IV, 30-39 MIN: ICD-10-PCS | Mod: S$GLB,,, | Performed by: STUDENT IN AN ORGANIZED HEALTH CARE EDUCATION/TRAINING PROGRAM

## 2023-11-08 PROCEDURE — 3061F PR NEG MICROALBUMINURIA RESULT DOCUMENTED/REVIEW: ICD-10-PCS | Mod: CPTII,S$GLB,, | Performed by: STUDENT IN AN ORGANIZED HEALTH CARE EDUCATION/TRAINING PROGRAM

## 2023-11-08 PROCEDURE — 3078F DIAST BP <80 MM HG: CPT | Mod: CPTII,S$GLB,, | Performed by: STUDENT IN AN ORGANIZED HEALTH CARE EDUCATION/TRAINING PROGRAM

## 2023-11-08 PROCEDURE — 3078F PR MOST RECENT DIASTOLIC BLOOD PRESSURE < 80 MM HG: ICD-10-PCS | Mod: CPTII,S$GLB,, | Performed by: STUDENT IN AN ORGANIZED HEALTH CARE EDUCATION/TRAINING PROGRAM

## 2023-11-08 PROCEDURE — 3008F PR BODY MASS INDEX (BMI) DOCUMENTED: ICD-10-PCS | Mod: CPTII,S$GLB,, | Performed by: STUDENT IN AN ORGANIZED HEALTH CARE EDUCATION/TRAINING PROGRAM

## 2023-11-08 PROCEDURE — 3008F BODY MASS INDEX DOCD: CPT | Mod: CPTII,S$GLB,, | Performed by: STUDENT IN AN ORGANIZED HEALTH CARE EDUCATION/TRAINING PROGRAM

## 2023-11-08 PROCEDURE — 99214 OFFICE O/P EST MOD 30 MIN: CPT | Mod: S$GLB,,, | Performed by: STUDENT IN AN ORGANIZED HEALTH CARE EDUCATION/TRAINING PROGRAM

## 2023-11-08 PROCEDURE — 99999 PR PBB SHADOW E&M-EST. PATIENT-LVL V: ICD-10-PCS | Mod: PBBFAC,,, | Performed by: STUDENT IN AN ORGANIZED HEALTH CARE EDUCATION/TRAINING PROGRAM

## 2023-11-08 PROCEDURE — 3074F PR MOST RECENT SYSTOLIC BLOOD PRESSURE < 130 MM HG: ICD-10-PCS | Mod: CPTII,S$GLB,, | Performed by: STUDENT IN AN ORGANIZED HEALTH CARE EDUCATION/TRAINING PROGRAM

## 2023-11-08 PROCEDURE — 3061F NEG MICROALBUMINURIA REV: CPT | Mod: CPTII,S$GLB,, | Performed by: STUDENT IN AN ORGANIZED HEALTH CARE EDUCATION/TRAINING PROGRAM

## 2023-11-08 PROCEDURE — 3044F PR MOST RECENT HEMOGLOBIN A1C LEVEL <7.0%: ICD-10-PCS | Mod: CPTII,S$GLB,, | Performed by: STUDENT IN AN ORGANIZED HEALTH CARE EDUCATION/TRAINING PROGRAM

## 2023-11-08 PROCEDURE — 3066F PR DOCUMENTATION OF TREATMENT FOR NEPHROPATHY: ICD-10-PCS | Mod: CPTII,S$GLB,, | Performed by: STUDENT IN AN ORGANIZED HEALTH CARE EDUCATION/TRAINING PROGRAM

## 2023-11-08 PROCEDURE — 3066F NEPHROPATHY DOC TX: CPT | Mod: CPTII,S$GLB,, | Performed by: STUDENT IN AN ORGANIZED HEALTH CARE EDUCATION/TRAINING PROGRAM

## 2023-11-08 PROCEDURE — 99999 PR PBB SHADOW E&M-EST. PATIENT-LVL V: CPT | Mod: PBBFAC,,, | Performed by: STUDENT IN AN ORGANIZED HEALTH CARE EDUCATION/TRAINING PROGRAM

## 2023-11-08 PROCEDURE — 1159F PR MEDICATION LIST DOCUMENTED IN MEDICAL RECORD: ICD-10-PCS | Mod: CPTII,S$GLB,, | Performed by: STUDENT IN AN ORGANIZED HEALTH CARE EDUCATION/TRAINING PROGRAM

## 2023-11-08 PROCEDURE — 1160F RVW MEDS BY RX/DR IN RCRD: CPT | Mod: CPTII,S$GLB,, | Performed by: STUDENT IN AN ORGANIZED HEALTH CARE EDUCATION/TRAINING PROGRAM

## 2023-11-08 PROCEDURE — 1160F PR REVIEW ALL MEDS BY PRESCRIBER/CLIN PHARMACIST DOCUMENTED: ICD-10-PCS | Mod: CPTII,S$GLB,, | Performed by: STUDENT IN AN ORGANIZED HEALTH CARE EDUCATION/TRAINING PROGRAM

## 2023-11-08 PROCEDURE — 3044F HG A1C LEVEL LT 7.0%: CPT | Mod: CPTII,S$GLB,, | Performed by: STUDENT IN AN ORGANIZED HEALTH CARE EDUCATION/TRAINING PROGRAM

## 2023-11-08 RX ORDER — LOSARTAN POTASSIUM AND HYDROCHLOROTHIAZIDE 12.5; 5 MG/1; MG/1
1 TABLET ORAL DAILY
COMMUNITY
End: 2023-11-22

## 2023-11-08 RX ORDER — GLIMEPIRIDE 1 MG/1
1 TABLET ORAL
Qty: 90 TABLET | Refills: 3 | OUTPATIENT
Start: 2023-11-08 | End: 2023-11-28

## 2023-11-08 NOTE — PROGRESS NOTES
Subjective:           Patient ID: Lacey Lange is a 53 y.o. female who presents today with a chief complaint of Follow-up (DM & HTN)  .    Chief Complaint:   Follow-up (DM & HTN)      History of Present Illness:    Lacey Lange is a 53 y.o. female who presents today with a chief complaint of Follow-up (DM & HTN)  .    54yo female states she is not feeling good.  Struggle daily.  Is scared to eat.      DM:  States has been having trouble keeping her glucose down.   When checking glucose will see various glucoses.  Will see 120s at times.  Monday, she ate pork and beans, her head/neck jumped and she checked her glucose and was not bad, probably 125-130.    Highest glucose she sees will be 200.    Says that her glucose goes up and down and would like to get a CGM.    Glucose on waking was 93.  Took Metformin in AM but makes her nauseous.  States avoid eggs because they make her sugar go up.  This AM ate a greek yogurt.  States glucose went up to 133.      HTN:  States that her pressure is down on Hyzaar 50/12.5mg daily.   Last took med last night.     UTI:  Was seen in ED on 11/3/23 for infection.  Took Rocephin 1g daily.  Also taking Cefpodoxime 200mg BID for 10 days, but is taking once daily at 6PM.     Reports that her glucose went up to 564.    States that her glucose had first been 93, then was not feeling well and co-worker gave her a piece of a snicker.  States glucose efrain to over 500.    Reports glucose was 564 when check after lunch on 11/3/23 with family.  States she took it again and was 389.    So went to the ED, state her glucose on her glucometer was 141 while waiting.    Then on ED glucometer was 100.   And with blood work in ED her glucose was 93.   Urinalysis collected showing no glucose in urine.      Had urine checked and had infection.     Suspect that glucometer did not read correctly, should not rise to 564.  Would not fall to 141 that fast.  If glucose that high, would have  "glucose in her urine but did not.  Could possible be related to alcohol on her finger.      Review of Systems   Constitutional:  Negative for activity change, fatigue, fever and unexpected weight change.   HENT:  Negative for congestion, nosebleeds, sinus pressure and sneezing.    Respiratory:  Negative for cough, shortness of breath and wheezing.    Cardiovascular:  Negative for chest pain, palpitations and leg swelling.   Gastrointestinal:  Positive for abdominal pain, diarrhea and nausea. Negative for abdominal distention and constipation.   Genitourinary:  Negative for difficulty urinating and dysuria.   Musculoskeletal:  Negative for back pain and gait problem.   Skin:  Negative for pallor and rash.   Neurological:  Positive for headaches. Negative for weakness and numbness.   Psychiatric/Behavioral:  Positive for dysphoric mood and sleep disturbance. The patient is nervous/anxious.         States to eat.  Does not eat because she is scared to eat.            Objective:        Vitals:    11/08/23 0859   BP: (!) 104/58   BP Location: Right arm   Patient Position: Sitting   BP Method: Medium (Manual)   Pulse: 85   Resp: 16   Temp: 97.6 °F (36.4 °C)   TempSrc: Temporal   SpO2: 99%   Weight: 72.7 kg (160 lb 6.2 oz)   Height: 5' 6" (1.676 m)       Body mass index is 25.89 kg/m².      Physical Exam  Vitals reviewed.   Constitutional:       General: She is not in acute distress.     Appearance: Normal appearance. She is not ill-appearing.   HENT:      Head: Normocephalic and atraumatic.      Right Ear: External ear normal.      Left Ear: External ear normal.      Nose: No rhinorrhea.      Mouth/Throat:      Mouth: Mucous membranes are moist.   Eyes:      Extraocular Movements: Extraocular movements intact.      Conjunctiva/sclera: Conjunctivae normal.   Cardiovascular:      Rate and Rhythm: Normal rate and regular rhythm.      Pulses: Normal pulses.      Heart sounds: No murmur heard.  Pulmonary:      Effort: " Pulmonary effort is normal. No respiratory distress.      Breath sounds: No wheezing.   Musculoskeletal:      Right lower leg: No edema.      Left lower leg: No edema.   Skin:     Coloration: Skin is not jaundiced.      Findings: No bruising.   Neurological:      General: No focal deficit present.      Mental Status: She is alert and oriented to person, place, and time.      Motor: No weakness.      Gait: Gait normal.   Psychiatric:      Comments: Patient showing signs of anxiety, legs bouncing on exam.   Voicing concern about HTN, DM and abd pain after eating.     Very poor understanding of her medical state.             Lab Results   Component Value Date     11/03/2023    K 3.2 (L) 11/03/2023     (H) 11/03/2023    CO2 20 (L) 11/03/2023    BUN 12 11/03/2023    CREATININE 0.7 11/03/2023    ANIONGAP 10 11/03/2023     Lab Results   Component Value Date    HGBA1C 6.8 (H) 09/09/2023     Lab Results   Component Value Date    BNP 17 08/26/2023       Lab Results   Component Value Date    WBC 3.42 (L) 11/03/2023    HGB 8.3 (L) 11/03/2023    HCT 30 (L) 11/03/2023    HCT 26.1 (L) 11/03/2023     11/03/2023    GRAN 1.2 (L) 11/03/2023    GRAN 34.8 (L) 11/03/2023     Lab Results   Component Value Date    CHOL 165 09/09/2023    HDL 39 (L) 09/09/2023    LDLCALC 115.2 09/09/2023    TRIG 54 09/09/2023          Current Outpatient Medications:     blood-glucose sensor (DEXCOM G7 SENSOR) Nan, 1 Device by Misc.(Non-Drug; Combo Route) route every 10 days., Disp: 3 each, Rfl: 11    cefpodoxime (VANTIN) 200 MG tablet, Take 1 tablet (200 mg total) by mouth every 12 (twelve) hours. for 10 days, Disp: 20 tablet, Rfl: 0    losartan-hydrochlorothiazide 50-12.5 mg (HYZAAR) 50-12.5 mg per tablet, Take 1 tablet by mouth once daily., Disp: , Rfl:     ONETOUCH DELICA PLUS LANCET 33 gauge Misc, 1 lancet  by Other route 3 (three) times daily., Disp: 100 each, Rfl: 11    ONETOUCH ULTRA TEST Strp, 1 strip by Other route 3 (three)  times daily., Disp: 100 each, Rfl: 11    simvastatin (ZOCOR) 10 MG tablet, Take 1 tablet (10 mg total) by mouth every evening., Disp: 90 tablet, Rfl: 3    glimepiride (AMARYL) 1 MG tablet, Take 1 tablet (1 mg total) by mouth before breakfast., Disp: 90 tablet, Rfl: 3     Outpatient Encounter Medications as of 11/8/2023   Medication Sig Dispense Refill    blood-glucose sensor (DEXCOM G7 SENSOR) Nan 1 Device by Misc.(Non-Drug; Combo Route) route every 10 days. 3 each 11    cefpodoxime (VANTIN) 200 MG tablet Take 1 tablet (200 mg total) by mouth every 12 (twelve) hours. for 10 days 20 tablet 0    losartan-hydrochlorothiazide 50-12.5 mg (HYZAAR) 50-12.5 mg per tablet Take 1 tablet by mouth once daily.      ONETOUCH DELICA PLUS LANCET 33 gauge Misc 1 lancet  by Other route 3 (three) times daily. 100 each 11    ONETOUCH ULTRA TEST Strp 1 strip by Other route 3 (three) times daily. 100 each 11    simvastatin (ZOCOR) 10 MG tablet Take 1 tablet (10 mg total) by mouth every evening. 90 tablet 3    [DISCONTINUED] metFORMIN (GLUCOPHAGE-XR) 500 MG ER 24hr tablet Take 1 tablet (500 mg total) by mouth 2 (two) times daily with meals. 180 tablet 1    glimepiride (AMARYL) 1 MG tablet Take 1 tablet (1 mg total) by mouth before breakfast. 90 tablet 3    [DISCONTINUED] losartan-hydrochlorothiazide 100-25 mg (HYZAAR) 100-25 mg per tablet Take 1 tablet by mouth once daily. (Patient not taking: Reported on 11/8/2023) 90 tablet 3    [DISCONTINUED] ONETOUCH ULTRA TEST Strp 1 strip by Other route 3 (three) times daily. 100 each 11     No facility-administered encounter medications on file as of 11/8/2023.          Assessment:       1. Type 2 diabetes mellitus with hyperglycemia, without long-term current use of insulin    2. Anxiety disorder due to medical condition    3. Primary hypertension    4. Overweight (BMI 25.0-29.9)    5. Dyslipidemia, goal LDL below 100           Plan:       Type 2 diabetes mellitus with hyperglycemia, without  long-term current use of insulin  -     glimepiride (AMARYL) 1 MG tablet; Take 1 tablet (1 mg total) by mouth before breakfast.  Dispense: 90 tablet; Refill: 3    Anxiety disorder due to medical condition  -     Ambulatory referral/consult to Psychiatry; Future; Expected date: 11/15/2023    Primary hypertension    Overweight (BMI 25.0-29.9)    Dyslipidemia, goal LDL below 100               DM:   - has not tolerated Metformin well, and will attempt alternative.   - not using GLP1 at this time due to GI upset or SGLT2 due to current UTI.   - start on Glimepiride 1mg daily in AM.   - advise to not take glucose after eating and not take more than 2 times per day.    - continue with statin.    HTN:   - BP controlled today.   - continue on Losartan/ HCTZ 50/12.5mg daily.    Anxiety:   - continue with counseling. But state cannot get in before 5PM.   - advise a medication for anxiety/stress.   - states that if she gets physical issues addressed her anxiety will be better; advised patient that I do not agree and think that she needs regular counseling and her physical issues are largely a result of her poorly controlled stress and anxiety.   - Could consider medications but patient is not interested in taking meds at this time.  Not taking her current meds regularly.    UTI:   - has UTI being treated with a good medication.   - would complete course of 2 time daily Abx at this time.

## 2023-11-08 NOTE — PATIENT INSTRUCTIONS
DM:   - has not tolerated Metformin well, and will attempt alternative.   - not using GLP1 at this time due to GI upset or SGLT2 due to current UTI.   - start on Glimepiride 1mg daily in AM.   - advise to not take glucose after eating and not take more than 2 times per day.    - continue with statin.    HTN:   - BP controlled today.   - continue on Losartan/ HCTZ 50/12.5mg daily.    Anxiety:   - continue with counseling. But state cannot get in before 5PM.   - advise a medication for anxiety/stress.   - states that if she gets physical issues addressed her anxiety will be better; advised patient that I do not agree and think that she needs regular counseling and her physical issues are largely a result of her poorly controlled stress and anxiety.   - Could consider medications but patient is not interested in taking meds at this time.  Not taking her current meds regularly.    UTI:   - has UTI being treated with a good medication.   - would complete course of 2 time daily Abx at this time.

## 2023-11-12 ENCOUNTER — PATIENT MESSAGE (OUTPATIENT)
Dept: DIABETES | Facility: CLINIC | Age: 53
End: 2023-11-12
Payer: COMMERCIAL

## 2023-11-13 ENCOUNTER — TELEPHONE (OUTPATIENT)
Dept: DIABETES | Facility: CLINIC | Age: 53
End: 2023-11-13
Payer: COMMERCIAL

## 2023-11-13 ENCOUNTER — NURSE TRIAGE (OUTPATIENT)
Dept: ADMINISTRATIVE | Facility: CLINIC | Age: 53
End: 2023-11-13
Payer: COMMERCIAL

## 2023-11-13 NOTE — TELEPHONE ENCOUNTER
Pt states that she spoke with someone this am to get assistance with paying for Dexcom G7. Pt calling to f/u,. States that Dexcom G 7 is $187.24 and is still at pharmacy. Advised to receive callback per protocol. VU. Encounter routed to provider for f/u.       Reason for Disposition   [1] Caller requesting NON-URGENT health information AND [2] PCP's office is the best resource    Protocols used: Information Only Call - No Triage-A-AH

## 2023-11-13 NOTE — TELEPHONE ENCOUNTER
Spoke to pt, stated she has not been able to  her dexcom g7 sensors, stated to pt that it was sent over on 9/21, and that she can check the status of  sensors from the pharmacy, also stated to pt that PA was intited and approved for g7 sensor, stated to pt that even though she has the sensor prescribed it is still best that's he has an glucometer to check blood sugars, pt verbalized understanding

## 2023-11-14 ENCOUNTER — TELEPHONE (OUTPATIENT)
Dept: DIABETES | Facility: CLINIC | Age: 53
End: 2023-11-14
Payer: COMMERCIAL

## 2023-11-14 NOTE — TELEPHONE ENCOUNTER
Spoke to pt stated that we are waiting to see what the cost of dexcom is at the supply company, stated that she should still be using to otc glucometer to test blood sugar, pt verbalized understanding

## 2023-11-22 ENCOUNTER — OFFICE VISIT (OUTPATIENT)
Dept: PRIMARY CARE CLINIC | Facility: CLINIC | Age: 53
End: 2023-11-22
Payer: COMMERCIAL

## 2023-11-22 VITALS
TEMPERATURE: 98 F | BODY MASS INDEX: 25.56 KG/M2 | HEART RATE: 85 BPM | HEIGHT: 66 IN | RESPIRATION RATE: 18 BRPM | OXYGEN SATURATION: 98 % | WEIGHT: 159.06 LBS | DIASTOLIC BLOOD PRESSURE: 64 MMHG | SYSTOLIC BLOOD PRESSURE: 130 MMHG

## 2023-11-22 DIAGNOSIS — Z76.89 ENCOUNTER TO ESTABLISH CARE: Primary | ICD-10-CM

## 2023-11-22 DIAGNOSIS — D64.9 NORMOCYTIC ANEMIA: ICD-10-CM

## 2023-11-22 DIAGNOSIS — Z12.12 ENCOUNTER FOR COLORECTAL CANCER SCREENING: ICD-10-CM

## 2023-11-22 DIAGNOSIS — Z91.148 NONCOMPLIANCE WITH MEDICATION TREATMENT DUE TO INTERMITTENT USE OF MEDICATION: ICD-10-CM

## 2023-11-22 DIAGNOSIS — I10 PRIMARY HYPERTENSION: Chronic | ICD-10-CM

## 2023-11-22 DIAGNOSIS — Z12.11 ENCOUNTER FOR COLORECTAL CANCER SCREENING: ICD-10-CM

## 2023-11-22 DIAGNOSIS — R79.89 LOW VITAMIN B12 LEVEL: ICD-10-CM

## 2023-11-22 DIAGNOSIS — E78.5 TYPE 2 DIABETES MELLITUS WITH HYPERLIPIDEMIA: ICD-10-CM

## 2023-11-22 DIAGNOSIS — E11.69 TYPE 2 DIABETES MELLITUS WITH HYPERLIPIDEMIA: ICD-10-CM

## 2023-11-22 PROCEDURE — 3066F NEPHROPATHY DOC TX: CPT | Mod: CPTII,S$GLB,, | Performed by: FAMILY MEDICINE

## 2023-11-22 PROCEDURE — 99214 PR OFFICE/OUTPT VISIT, EST, LEVL IV, 30-39 MIN: ICD-10-PCS | Mod: S$GLB,,, | Performed by: FAMILY MEDICINE

## 2023-11-22 PROCEDURE — 3008F PR BODY MASS INDEX (BMI) DOCUMENTED: ICD-10-PCS | Mod: CPTII,S$GLB,, | Performed by: FAMILY MEDICINE

## 2023-11-22 PROCEDURE — 1159F MED LIST DOCD IN RCRD: CPT | Mod: CPTII,S$GLB,, | Performed by: FAMILY MEDICINE

## 2023-11-22 PROCEDURE — 3044F PR MOST RECENT HEMOGLOBIN A1C LEVEL <7.0%: ICD-10-PCS | Mod: CPTII,S$GLB,, | Performed by: FAMILY MEDICINE

## 2023-11-22 PROCEDURE — 3075F SYST BP GE 130 - 139MM HG: CPT | Mod: CPTII,S$GLB,, | Performed by: FAMILY MEDICINE

## 2023-11-22 PROCEDURE — 3061F NEG MICROALBUMINURIA REV: CPT | Mod: CPTII,S$GLB,, | Performed by: FAMILY MEDICINE

## 2023-11-22 PROCEDURE — 99999 PR PBB SHADOW E&M-EST. PATIENT-LVL IV: CPT | Mod: PBBFAC,,, | Performed by: FAMILY MEDICINE

## 2023-11-22 PROCEDURE — 1159F PR MEDICATION LIST DOCUMENTED IN MEDICAL RECORD: ICD-10-PCS | Mod: CPTII,S$GLB,, | Performed by: FAMILY MEDICINE

## 2023-11-22 PROCEDURE — 3075F PR MOST RECENT SYSTOLIC BLOOD PRESS GE 130-139MM HG: ICD-10-PCS | Mod: CPTII,S$GLB,, | Performed by: FAMILY MEDICINE

## 2023-11-22 PROCEDURE — 3066F PR DOCUMENTATION OF TREATMENT FOR NEPHROPATHY: ICD-10-PCS | Mod: CPTII,S$GLB,, | Performed by: FAMILY MEDICINE

## 2023-11-22 PROCEDURE — 3078F DIAST BP <80 MM HG: CPT | Mod: CPTII,S$GLB,, | Performed by: FAMILY MEDICINE

## 2023-11-22 PROCEDURE — 99999 PR PBB SHADOW E&M-EST. PATIENT-LVL IV: ICD-10-PCS | Mod: PBBFAC,,, | Performed by: FAMILY MEDICINE

## 2023-11-22 PROCEDURE — 1160F PR REVIEW ALL MEDS BY PRESCRIBER/CLIN PHARMACIST DOCUMENTED: ICD-10-PCS | Mod: CPTII,S$GLB,, | Performed by: FAMILY MEDICINE

## 2023-11-22 PROCEDURE — 3044F HG A1C LEVEL LT 7.0%: CPT | Mod: CPTII,S$GLB,, | Performed by: FAMILY MEDICINE

## 2023-11-22 PROCEDURE — 3078F PR MOST RECENT DIASTOLIC BLOOD PRESSURE < 80 MM HG: ICD-10-PCS | Mod: CPTII,S$GLB,, | Performed by: FAMILY MEDICINE

## 2023-11-22 PROCEDURE — 3008F BODY MASS INDEX DOCD: CPT | Mod: CPTII,S$GLB,, | Performed by: FAMILY MEDICINE

## 2023-11-22 PROCEDURE — 99214 OFFICE O/P EST MOD 30 MIN: CPT | Mod: S$GLB,,, | Performed by: FAMILY MEDICINE

## 2023-11-22 PROCEDURE — 3061F PR NEG MICROALBUMINURIA RESULT DOCUMENTED/REVIEW: ICD-10-PCS | Mod: CPTII,S$GLB,, | Performed by: FAMILY MEDICINE

## 2023-11-22 PROCEDURE — 1160F RVW MEDS BY RX/DR IN RCRD: CPT | Mod: CPTII,S$GLB,, | Performed by: FAMILY MEDICINE

## 2023-11-22 RX ORDER — AMLODIPINE BESYLATE 2.5 MG/1
2.5 TABLET ORAL DAILY
Qty: 90 TABLET | Refills: 0 | Status: SHIPPED | OUTPATIENT
Start: 2023-11-22 | End: 2023-12-04 | Stop reason: SDUPTHER

## 2023-11-22 NOTE — PROGRESS NOTES
"Subjective:       Patient ID: Lacey Lange is a 53 y.o. female.    Chief Complaint: Establish Care    Requesting to transfer from previous PCP, says she did not want to get into the details as to why she is requesting transfer.    BP on home monitoring has been labile, 130-170s/80-100s, but only taking losartan/HCTZ occasionally, "when my pressure goes up. "Says the medication makes her feel bad, but she is not entirely clear on exactly how it makes her feel, but in any case says it has not appeared to help lower her blood pressure on the occasions she does take it.  Tolerating glimepiride better than metformin since switching to it, however.  Appears to have a normocytic anemia, B12 levels low a few months ago.  Says this was not addressed by ordering provider.  Cologuard was ordered, as well, but she has not completed this.  Does endorse occasional dark stools      Review of Systems   Respiratory:  Negative for shortness of breath.    Gastrointestinal:  Negative for anal bleeding.   Allergic/Immunologic: Negative for immunocompromised state.   Neurological:  Positive for dizziness and light-headedness. Negative for seizures and syncope.       Objective:      Vitals:    11/22/23 1235   BP: 130/64   BP Location: Right arm   Patient Position: Sitting   BP Method: Medium (Manual)   Pulse: 85   Resp: 18   Temp: 97.8 °F (36.6 °C)   TempSrc: Temporal   SpO2: 98%   Weight: 72.1 kg (159 lb 1 oz)   Height: 5' 6" (1.676 m)     BP Readings from Last 5 Encounters:   11/22/23 130/64   11/08/23 (!) 104/58   11/03/23 116/62   10/17/23 120/72   09/21/23 132/80     Wt Readings from Last 5 Encounters:   11/22/23 72.1 kg (159 lb 1 oz)   11/08/23 72.7 kg (160 lb 6.2 oz)   11/03/23 74.4 kg (164 lb)   10/17/23 74.4 kg (164 lb 2.1 oz)   10/17/23 74.5 kg (164 lb 2.1 oz)     Physical Exam  Vitals and nursing note reviewed.   Constitutional:       General: She is not in acute distress.     Appearance: Normal appearance. She is " well-developed.   HENT:      Head: Normocephalic and atraumatic.   Neck:      Vascular: No carotid bruit.   Cardiovascular:      Rate and Rhythm: Normal rate and regular rhythm.      Heart sounds: Normal heart sounds.   Pulmonary:      Effort: Pulmonary effort is normal.      Breath sounds: Normal breath sounds.   Musculoskeletal:      Right lower leg: No edema.      Left lower leg: No edema.   Skin:     General: Skin is warm and dry.   Neurological:      Mental Status: She is alert and oriented to person, place, and time.   Psychiatric:         Mood and Affect: Mood normal.         Behavior: Behavior normal.         Lab Results   Component Value Date    WBC 3.42 (L) 11/03/2023    HGB 8.3 (L) 11/03/2023    HCT 30 (L) 11/03/2023     11/03/2023    CHOL 165 09/09/2023    TRIG 54 09/09/2023    HDL 39 (L) 09/09/2023    ALT 15 11/03/2023    AST 32 11/03/2023     11/03/2023    K 3.2 (L) 11/03/2023     (H) 11/03/2023    CREATININE 0.7 11/03/2023    BUN 12 11/03/2023    CO2 20 (L) 11/03/2023    TSH 0.877 09/09/2023    HGBA1C 6.8 (H) 09/09/2023      Assessment:       1. Encounter to establish care    2. Noncompliance with medication treatment due to intermittent use of medication    3. Type 2 diabetes mellitus with hyperlipidemia    4. Encounter for colorectal cancer screening    5. Low vitamin B12 level    6. Normocytic anemia    7. Primary hypertension        Plan:       Encounter to establish care    Noncompliance with medication treatment due to intermittent use of medication  Stressed importance of taking all medications as prescribed  Type 2 diabetes mellitus with hyperlipidemia  Continue current regimen, follow-up with diabetes management as scheduled  Encounter for colorectal cancer screening  -     Case Request Endoscopy: COLONOSCOPY  In light of dark stools with anemia, would recommend full colonoscopy  Low vitamin B12 level  -     CBC Auto Differential; Future; Expected date: 11/22/2023  -      Vitamin B12; Future; Expected date: 11/22/2023  -     Folate; Future; Expected date: 11/22/2023    Normocytic anemia  -     CBC Auto Differential; Future; Expected date: 11/22/2023  -     Ferritin; Future; Expected date: 11/22/2023  -     Iron and TIBC; Future; Expected date: 11/22/2023  -     Vitamin B12; Future; Expected date: 11/22/2023  -     Folate; Future; Expected date: 11/22/2023    Primary hypertension  -     amLODIPine (NORVASC) 2.5 MG tablet; Take 1 tablet (2.5 mg total) by mouth once daily.  Dispense: 90 tablet; Refill: 0  Switch to low-dose amlodipine.  Take blood pressure meds and readings daily.  Follow-up via telemedicine in 2 weeks    Medication List with Changes/Refills   New Medications    AMLODIPINE (NORVASC) 2.5 MG TABLET    Take 1 tablet (2.5 mg total) by mouth once daily.   Current Medications    GLIMEPIRIDE (AMARYL) 1 MG TABLET    Take 1 tablet (1 mg total) by mouth before breakfast.    ONETOUCH DELICA PLUS LANCET 33 GAUGE MISC    1 lancet  by Other route 3 (three) times daily.    ONETOUCH ULTRA TEST STRP    1 strip by Other route 3 (three) times daily.    SIMVASTATIN (ZOCOR) 10 MG TABLET    Take 1 tablet (10 mg total) by mouth every evening.   Discontinued Medications    BLOOD-GLUCOSE SENSOR (DEXCOM G7 SENSOR) SUSIE    1 Device by Misc.(Non-Drug; Combo Route) route every 10 days.    LOSARTAN-HYDROCHLOROTHIAZIDE 50-12.5 MG (HYZAAR) 50-12.5 MG PER TABLET    Take 1 tablet by mouth once daily.

## 2023-11-26 ENCOUNTER — NURSE TRIAGE (OUTPATIENT)
Dept: ADMINISTRATIVE | Facility: CLINIC | Age: 53
End: 2023-11-26
Payer: COMMERCIAL

## 2023-11-26 NOTE — TELEPHONE ENCOUNTER
"Feeling cold, weak, fatigued, type 2 diabetic fluctuating sugars since not feeling well- symptoms started 11/22 evening. Out of test strips. Last CBG this morning normal. Unable to check current blood sugar. Pt states she "could feel her sugar moving."     Care advice to be seen within 4 hours. Recommended urgent care or ED. Pt reluctant- states urgent care too expensive and does not feel like waiting in ED.   Reason for Disposition   [1] MODERATE weakness (i.e., interferes with work, school, normal activities) AND [2] cause unknown  (Exceptions: Weakness from acute minor illness or poor fluid intake; weakness is chronic and not worse.)    Additional Information   Negative: SEVERE difficulty breathing (e.g., struggling for each breath, speaks in single words)   Negative: Shock suspected (e.g., cold/pale/clammy skin, too weak to stand, low BP, rapid pulse)   Negative: Difficult to awaken or acting confused (e.g., disoriented, slurred speech)   Negative: [1] Fainted > 15 minutes ago AND [2] still feels too weak or dizzy to stand   Negative: [1] SEVERE weakness (i.e., unable to walk or barely able to walk, requires support) AND [2] new-onset or worsening   Negative: Sounds like a life-threatening emergency to the triager   Negative: Difficulty breathing   Negative: Heart beating < 50 beats per minute OR > 140 beats per minute   Negative: Extra heartbeats, irregular heart beating, or heart is beating very fast  (i.e., "palpitations")   Negative: Follows large amount of bleeding (e.g., from vomiting, rectum, vagina  (Exception: Small brief weakness from sight of a small amount blood.)   Negative: Black or tarry bowel movements   Negative: [1] Drinking very little AND [2] dehydration suspected (e.g., no urine > 12 hours, very dry mouth, very lightheaded)   Negative: Patient sounds very sick or weak to the triager    Protocols used: Weakness (Generalized) and Fatigue-A-AH    "

## 2023-11-27 ENCOUNTER — TELEPHONE (OUTPATIENT)
Dept: DIABETES | Facility: CLINIC | Age: 53
End: 2023-11-27
Payer: COMMERCIAL

## 2023-11-27 ENCOUNTER — TELEPHONE (OUTPATIENT)
Dept: SURGERY | Facility: CLINIC | Age: 53
End: 2023-11-27
Payer: COMMERCIAL

## 2023-11-27 RX ORDER — SODIUM, POTASSIUM,MAG SULFATES 17.5-3.13G
1 SOLUTION, RECONSTITUTED, ORAL ORAL DAILY
Qty: 1 KIT | Refills: 0 | Status: SHIPPED | OUTPATIENT
Start: 2023-11-27 | End: 2023-11-29

## 2023-11-27 NOTE — TELEPHONE ENCOUNTER
The patient has been advised the Colonoscopy Prep Kit will be ordered from the patient's verified preferred pharmacy on file. The medication can  be picked up by the patient, or the patient's designated representative.The patient was further explained the Colonoscopy Prep instructions will be mailed to the patient verified mailing address on file, or put onto the [employer] portal, whichever method of delivery the patient prefers.Additionally this patient was informed,not to follow the instructions that comes with the bowel prep medication. However, the patient was instructed to please follow the Colonoscopy Bowel Prep instructions that's being provided by the . The patient was asked to please to follow the Colonoscopy Prep instructions being provided as precisely,and  meticulously as possible.The patient was advised you  will receive a follow up phone call to summarize the Colonoscopy Prep instructions prior to the scheduled Colonoscopy procedure date. At this time the patient will be given an opportunity to ask any questions regarding the Colonoscopy procedure, and it's associated Bowel Prep instructions.

## 2023-11-27 NOTE — TELEPHONE ENCOUNTER
Called patient in reference to a referral to Colorectal Surgery for colon cancer screening. Patient verbally consented to a Colonoscopy and requested to be scheduled for a Colonoscopy on 02/08/2023 Patient was advised a designated  is required on the day of the Colonoscopy to drive the patient home and the  must be at least. 18 years old.Colonoscopy Prep instructions were thoroughly explained and discussed with the patient.It was emphasized, and reiterated to the patient, to please not to follow the bowel prep instructions that comes with the bowel prep package.However, to please follow the prep instructions that will be received in the mail,or via the [employer] portal, or by both modes of delivery, which ever method of delivery the patient prefers,from the [facility]   Patient acknowledges understanding Prep instructions as explained and discussed on the phone.. Patient was advised the Colonoscopy Prep instructions discussed and explained on the phone,are being mailed out to the patient's verified address on file,or put onto the [employer] portal,or both methods of delivery, whichever the patient prefers. Patient's address on file was verified with the patient for accuracy of mailing. Patient's medications on file was reviewed with the patient for accuracy of information. Patient denies taking  any other medications other than those listed and verified on medication profile.Patient was explained the Colonoscopy will be performed here at [facility]. Patient was further explained the Pre-Op will call one day prior to the procedure date, to discuss Pre-Op instructions;and what time to report for the Colonoscopy. The patient was given the opportunity to ask any questions about the Colonoscopy. No further issues were discussed.

## 2023-11-28 ENCOUNTER — NURSE TRIAGE (OUTPATIENT)
Dept: ADMINISTRATIVE | Facility: CLINIC | Age: 53
End: 2023-11-28
Payer: COMMERCIAL

## 2023-12-04 ENCOUNTER — OFFICE VISIT (OUTPATIENT)
Dept: PRIMARY CARE CLINIC | Facility: CLINIC | Age: 53
End: 2023-12-04
Payer: COMMERCIAL

## 2023-12-04 VITALS
OXYGEN SATURATION: 98 % | TEMPERATURE: 98 F | WEIGHT: 150.25 LBS | HEART RATE: 99 BPM | BODY MASS INDEX: 25.65 KG/M2 | HEIGHT: 64 IN | SYSTOLIC BLOOD PRESSURE: 130 MMHG | RESPIRATION RATE: 18 BRPM | DIASTOLIC BLOOD PRESSURE: 78 MMHG

## 2023-12-04 DIAGNOSIS — B96.5 PSEUDOMONAS URINARY TRACT INFECTION: ICD-10-CM

## 2023-12-04 DIAGNOSIS — E16.2 HYPOGLYCEMIA: ICD-10-CM

## 2023-12-04 DIAGNOSIS — E78.5 TYPE 2 DIABETES MELLITUS WITH HYPERLIPIDEMIA: ICD-10-CM

## 2023-12-04 DIAGNOSIS — N39.0 PSEUDOMONAS URINARY TRACT INFECTION: ICD-10-CM

## 2023-12-04 DIAGNOSIS — D61.818 PANCYTOPENIA: Primary | ICD-10-CM

## 2023-12-04 DIAGNOSIS — I10 PRIMARY HYPERTENSION: Chronic | ICD-10-CM

## 2023-12-04 DIAGNOSIS — E53.8 B12 DEFICIENCY: ICD-10-CM

## 2023-12-04 DIAGNOSIS — E11.69 TYPE 2 DIABETES MELLITUS WITH HYPERLIPIDEMIA: ICD-10-CM

## 2023-12-04 PROCEDURE — 99999 PR PBB SHADOW E&M-EST. PATIENT-LVL IV: ICD-10-PCS | Mod: PBBFAC,,, | Performed by: FAMILY MEDICINE

## 2023-12-04 PROCEDURE — 96372 PR INJECTION,THERAP/PROPH/DIAG2ST, IM OR SUBCUT: ICD-10-PCS | Mod: S$GLB,,, | Performed by: FAMILY MEDICINE

## 2023-12-04 PROCEDURE — 96372 THER/PROPH/DIAG INJ SC/IM: CPT | Mod: S$GLB,,, | Performed by: FAMILY MEDICINE

## 2023-12-04 PROCEDURE — 3061F NEG MICROALBUMINURIA REV: CPT | Mod: CPTII,S$GLB,, | Performed by: FAMILY MEDICINE

## 2023-12-04 PROCEDURE — 1160F RVW MEDS BY RX/DR IN RCRD: CPT | Mod: CPTII,S$GLB,, | Performed by: FAMILY MEDICINE

## 2023-12-04 PROCEDURE — 3061F PR NEG MICROALBUMINURIA RESULT DOCUMENTED/REVIEW: ICD-10-PCS | Mod: CPTII,S$GLB,, | Performed by: FAMILY MEDICINE

## 2023-12-04 PROCEDURE — 3008F BODY MASS INDEX DOCD: CPT | Mod: CPTII,S$GLB,, | Performed by: FAMILY MEDICINE

## 2023-12-04 PROCEDURE — 1159F PR MEDICATION LIST DOCUMENTED IN MEDICAL RECORD: ICD-10-PCS | Mod: CPTII,S$GLB,, | Performed by: FAMILY MEDICINE

## 2023-12-04 PROCEDURE — 1160F PR REVIEW ALL MEDS BY PRESCRIBER/CLIN PHARMACIST DOCUMENTED: ICD-10-PCS | Mod: CPTII,S$GLB,, | Performed by: FAMILY MEDICINE

## 2023-12-04 PROCEDURE — 99214 PR OFFICE/OUTPT VISIT, EST, LEVL IV, 30-39 MIN: ICD-10-PCS | Mod: 25,S$GLB,, | Performed by: FAMILY MEDICINE

## 2023-12-04 PROCEDURE — 3066F PR DOCUMENTATION OF TREATMENT FOR NEPHROPATHY: ICD-10-PCS | Mod: CPTII,S$GLB,, | Performed by: FAMILY MEDICINE

## 2023-12-04 PROCEDURE — 99214 OFFICE O/P EST MOD 30 MIN: CPT | Mod: 25,S$GLB,, | Performed by: FAMILY MEDICINE

## 2023-12-04 PROCEDURE — 3044F HG A1C LEVEL LT 7.0%: CPT | Mod: CPTII,S$GLB,, | Performed by: FAMILY MEDICINE

## 2023-12-04 PROCEDURE — 1159F MED LIST DOCD IN RCRD: CPT | Mod: CPTII,S$GLB,, | Performed by: FAMILY MEDICINE

## 2023-12-04 PROCEDURE — 3066F NEPHROPATHY DOC TX: CPT | Mod: CPTII,S$GLB,, | Performed by: FAMILY MEDICINE

## 2023-12-04 PROCEDURE — 3075F SYST BP GE 130 - 139MM HG: CPT | Mod: CPTII,S$GLB,, | Performed by: FAMILY MEDICINE

## 2023-12-04 PROCEDURE — 3008F PR BODY MASS INDEX (BMI) DOCUMENTED: ICD-10-PCS | Mod: CPTII,S$GLB,, | Performed by: FAMILY MEDICINE

## 2023-12-04 PROCEDURE — 87086 URINE CULTURE/COLONY COUNT: CPT | Performed by: FAMILY MEDICINE

## 2023-12-04 PROCEDURE — 3075F PR MOST RECENT SYSTOLIC BLOOD PRESS GE 130-139MM HG: ICD-10-PCS | Mod: CPTII,S$GLB,, | Performed by: FAMILY MEDICINE

## 2023-12-04 PROCEDURE — 3078F DIAST BP <80 MM HG: CPT | Mod: CPTII,S$GLB,, | Performed by: FAMILY MEDICINE

## 2023-12-04 PROCEDURE — 3078F PR MOST RECENT DIASTOLIC BLOOD PRESSURE < 80 MM HG: ICD-10-PCS | Mod: CPTII,S$GLB,, | Performed by: FAMILY MEDICINE

## 2023-12-04 PROCEDURE — 3044F PR MOST RECENT HEMOGLOBIN A1C LEVEL <7.0%: ICD-10-PCS | Mod: CPTII,S$GLB,, | Performed by: FAMILY MEDICINE

## 2023-12-04 PROCEDURE — 99999 PR PBB SHADOW E&M-EST. PATIENT-LVL IV: CPT | Mod: PBBFAC,,, | Performed by: FAMILY MEDICINE

## 2023-12-04 RX ORDER — ONDANSETRON 4 MG/1
4 TABLET, ORALLY DISINTEGRATING ORAL EVERY 6 HOURS PRN
Qty: 20 TABLET | Refills: 1 | Status: SHIPPED | OUTPATIENT
Start: 2023-12-04 | End: 2024-01-05

## 2023-12-04 RX ORDER — NAPROXEN SODIUM 220 MG/1
81 TABLET, FILM COATED ORAL DAILY
COMMUNITY
End: 2023-12-04

## 2023-12-04 RX ORDER — GLIMEPIRIDE 1 MG/1
1 TABLET ORAL
COMMUNITY
End: 2023-12-13

## 2023-12-04 RX ORDER — CYANOCOBALAMIN 1000 UG/ML
1000 INJECTION, SOLUTION INTRAMUSCULAR; SUBCUTANEOUS
Status: DISCONTINUED | OUTPATIENT
Start: 2023-12-04 | End: 2024-01-01

## 2023-12-04 RX ORDER — AMLODIPINE BESYLATE 5 MG/1
5 TABLET ORAL DAILY
Qty: 90 TABLET | Refills: 0 | Status: SHIPPED | OUTPATIENT
Start: 2023-12-04 | End: 2024-02-22 | Stop reason: SDUPTHER

## 2023-12-04 RX ADMIN — CYANOCOBALAMIN 1000 MCG: 1000 INJECTION, SOLUTION INTRAMUSCULAR; SUBCUTANEOUS at 02:12

## 2023-12-04 NOTE — PROGRESS NOTES
"Subjective:       Patient ID: Lacey Lange is a 53 y.o. female.    Chief Complaint: Nausea and Abdominal Pain (Started 3 days ago)    Went to the ER late last month with extreme fatigue and weakness, found to be significantly anemic/pancytopenic.  Was transfused 1 unit of PRBCs.  Followed up a few days later with persistent symptoms.  Blood pressure has been consistently elevated, although better than before.  Taking amlodipine consistently.  Stop taking metformin due to hypoglycemia, still taking low-dose glimepiride.  No recurrent hypoglycemia since then.  Has never been evaluated by Hematology, appointment not scheduled yet.  Has never had colonoscopy, scheduled for February    Nausea  Associated symptoms include abdominal pain, diaphoresis, fatigue and nausea.   Abdominal Pain  Associated symptoms include nausea. Pertinent negatives include no dysuria.     Review of Systems   Constitutional:  Positive for appetite change, diaphoresis, fatigue and unexpected weight change.   HENT:  Negative for trouble swallowing.    Respiratory:  Positive for shortness of breath.    Cardiovascular:  Negative for palpitations and leg swelling.   Gastrointestinal:  Positive for abdominal pain and nausea. Negative for blood in stool.   Genitourinary:  Negative for dysuria and urgency.   Psychiatric/Behavioral:  Negative for agitation.        Objective:      Vitals:    12/04/23 1345   BP: (!) 154/80   BP Location: Left arm   Patient Position: Sitting   BP Method: Medium (Manual)   Pulse: 99   Resp: 18   Temp: 97.5 °F (36.4 °C)   TempSrc: Temporal   SpO2: 98%   Weight: 68.2 kg (150 lb 3.9 oz)   Height: 5' 4" (1.626 m)     BP Readings from Last 5 Encounters:   12/04/23 (!) 154/80   11/28/23 (!) 169/77   11/26/23 139/77   11/22/23 130/64   11/08/23 (!) 104/58     Wt Readings from Last 5 Encounters:   12/04/23 68.2 kg (150 lb 3.9 oz)   11/28/23 70.8 kg (156 lb)   11/26/23 71.3 kg (157 lb 3 oz)   11/22/23 72.1 kg (159 lb 1 oz) " Partial Purse String (Intermediate) Text: Given the location of the defect and the characteristics of the surrounding skin an intermediate purse string closure was deemed most appropriate.  Undermining was performed circumfirentially around the surgical defect.  A purse string suture was then placed and tightened. Wound tension only allowed a partial closure of the circular defect.   11/08/23 72.7 kg (160 lb 6.2 oz)     Physical Exam  Vitals and nursing note reviewed.   Constitutional:       General: She is not in acute distress.     Appearance: Normal appearance. She is well-developed.   HENT:      Head: Normocephalic and atraumatic.   Cardiovascular:      Rate and Rhythm: Normal rate and regular rhythm.      Heart sounds: Normal heart sounds.   Pulmonary:      Effort: Pulmonary effort is normal.      Breath sounds: Normal breath sounds.   Abdominal:      General: Bowel sounds are normal. There is no distension.      Tenderness: There is no abdominal tenderness.   Musculoskeletal:      Right lower leg: No edema.      Left lower leg: No edema.   Skin:     General: Skin is warm and dry.   Neurological:      Mental Status: She is alert and oriented to person, place, and time.   Psychiatric:         Mood and Affect: Mood normal.         Behavior: Behavior normal.         Lab Results   Component Value Date    WBC 1.82 (LL) 11/28/2023    HGB 9.2 (L) 11/28/2023    HCT 29.3 (L) 11/28/2023     (L) 11/28/2023    CHOL 165 09/09/2023    TRIG 54 09/09/2023    HDL 39 (L) 09/09/2023    ALT 30 11/28/2023    AST 76 (H) 11/28/2023     11/28/2023    K 3.8 11/28/2023     11/28/2023    CREATININE 0.7 11/28/2023    BUN 10 11/28/2023    CO2 19 (L) 11/28/2023    TSH 1.080 11/26/2023    HGBA1C 6.8 (H) 09/09/2023      Assessment:       1. Pancytopenia    2. B12 deficiency    3. Primary hypertension    4. Type 2 diabetes mellitus with hyperlipidemia    5. Hypoglycemia    6. Pseudomonas urinary tract infection        Plan:       Pancytopenia  -     Ambulatory referral/consult to Hematology / Oncology; Future; Expected date: 12/11/2023  Recheck CBC today.  Needs follow-up with Hematology for further workup.  B12 deficiency  -     Ambulatory referral/consult to Hematology / Oncology; Future; Expected date: 12/11/2023  -     cyanocobalamin injection 1,000 mcg  Follow-up with Hematology.  B12 weekly for  the next 4 weeks.  Primary hypertension  -     amLODIPine (NORVASC) 5 MG tablet; Take 1 tablet (5 mg total) by mouth once daily.  Dispense: 90 tablet; Refill: 0  Increase amlodipine to 5 mg daily, reassess in 2 weeks  Type 2 diabetes mellitus with hyperlipidemia  Continue glimepiride, followed by diabetes management  Hypoglycemia  Better since off metformin  Pseudomonas urinary tract infection  -     CULTURE, URINE  Only took 1 dose of antibiotics, but not having typical UTI symptoms at this point.  Recheck culture  Other orders  -     ondansetron (ZOFRAN-ODT) 4 MG TbDL; Take 1 tablet (4 mg total) by mouth every 6 (six) hours as needed (nausea).  Dispense: 20 tablet; Refill: 1      Medication List with Changes/Refills   New Medications    ONDANSETRON (ZOFRAN-ODT) 4 MG TBDL    Take 1 tablet (4 mg total) by mouth every 6 (six) hours as needed (nausea).   Current Medications    GLIMEPIRIDE (AMARYL) 1 MG TABLET    Take 1 mg by mouth before breakfast.    ONETOUCH DELICA PLUS LANCET 33 GAUGE MISC    1 lancet  by Other route 3 (three) times daily.    ONETOUCH ULTRA TEST STRP    1 strip by Other route 3 (three) times daily.    SIMVASTATIN (ZOCOR) 10 MG TABLET    Take 1 tablet (10 mg total) by mouth every evening.   Changed and/or Refilled Medications    Modified Medication Previous Medication    AMLODIPINE (NORVASC) 5 MG TABLET amLODIPine (NORVASC) 2.5 MG tablet       Take 1 tablet (5 mg total) by mouth once daily.    Take 1 tablet (2.5 mg total) by mouth once daily.   Discontinued Medications    ASPIRIN 81 MG CHEW    Take 81 mg by mouth once daily.    CIPROFLOXACIN HCL (CIPRO) 500 MG TABLET    Take 1 tablet (500 mg total) by mouth 2 (two) times daily. for 10 days

## 2023-12-04 NOTE — PROGRESS NOTES
Verified pt ID using name and . NDKA. Administered b12 1,000mcg in left ventrogluteal per physician order using aseptic technique. Aspirated and no blood return noted. Pt tolerated well with no adverse reactions noted.

## 2023-12-06 ENCOUNTER — TELEPHONE (OUTPATIENT)
Dept: PRIMARY CARE CLINIC | Facility: CLINIC | Age: 53
End: 2023-12-06
Payer: COMMERCIAL

## 2023-12-06 LAB
BACTERIA UR CULT: NORMAL
BACTERIA UR CULT: NORMAL

## 2023-12-06 NOTE — TELEPHONE ENCOUNTER
----- Message from Beatriz Sánchez sent at 12/6/2023 12:02 PM CST -----  Contact: Self/ [phone]  Caller is requesting an earlier appointment then we can schedule.  Caller is requesting a message be sent to the provider.  If this is for urgent care symptoms, did you offer other providers at this location, providers at other locations, or [facility]? (yes, no, n/a):  n/a  If this is for the patients physical, did you offer to schedule next available and put on wait list, or to see NP or PA for their physical?  (yes, no, n/a): n/a  When is the next available appointment with their provider:    Reason for the appointment:  pt is having problems following taking her blood pressure medication   Patient preference of timeframe to be scheduled:  today   Would the patient like a call back, or a response through their [facility] portal?:   call back   Comments:

## 2023-12-06 NOTE — TELEPHONE ENCOUNTER
----- Message from Katya Thomas sent at 12/6/2023 11:36 AM CST -----  Contact: Daria, [phone]  Calling because she has been sick since she got the B12 injection Monday. She needs a note to go back to work. Please advise. Thanks.

## 2023-12-07 ENCOUNTER — NUTRITION (OUTPATIENT)
Dept: DIABETES | Facility: CLINIC | Age: 53
End: 2023-12-07
Payer: COMMERCIAL

## 2023-12-07 VITALS — WEIGHT: 155 LBS | BODY MASS INDEX: 26.46 KG/M2 | HEIGHT: 64 IN

## 2023-12-07 DIAGNOSIS — E11.9 TYPE 2 DIABETES MELLITUS WITHOUT COMPLICATION, WITHOUT LONG-TERM CURRENT USE OF INSULIN: Primary | ICD-10-CM

## 2023-12-07 DIAGNOSIS — E11.65 TYPE 2 DIABETES MELLITUS WITH HYPERGLYCEMIA, WITHOUT LONG-TERM CURRENT USE OF INSULIN: Primary | ICD-10-CM

## 2023-12-07 DIAGNOSIS — E11.65 TYPE 2 DIABETES MELLITUS WITH HYPERGLYCEMIA, UNSPECIFIED WHETHER LONG TERM INSULIN USE: ICD-10-CM

## 2023-12-07 DIAGNOSIS — E11.65 TYPE 2 DIABETES MELLITUS WITH HYPERGLYCEMIA, WITHOUT LONG-TERM CURRENT USE OF INSULIN: ICD-10-CM

## 2023-12-07 PROCEDURE — 99999 PR PBB SHADOW E&M-EST. PATIENT-LVL I: ICD-10-PCS | Mod: PBBFAC,,, | Performed by: DIETITIAN, REGISTERED

## 2023-12-07 PROCEDURE — G0108 PR DIAB MANAGE TRN  PER INDIV: ICD-10-PCS | Mod: S$GLB,,, | Performed by: DIETITIAN, REGISTERED

## 2023-12-07 PROCEDURE — 99999 PR PBB SHADOW E&M-EST. PATIENT-LVL I: CPT | Mod: PBBFAC,,, | Performed by: DIETITIAN, REGISTERED

## 2023-12-07 PROCEDURE — G0108 DIAB MANAGE TRN  PER INDIV: HCPCS | Mod: S$GLB,,, | Performed by: DIETITIAN, REGISTERED

## 2023-12-07 RX ORDER — BLOOD-GLUCOSE SENSOR
1 EACH MISCELLANEOUS
Qty: 3 EACH | Refills: 11 | Status: SHIPPED | OUTPATIENT
Start: 2023-12-07 | End: 2023-12-29 | Stop reason: SDUPTHER

## 2023-12-07 NOTE — TELEPHONE ENCOUNTER
Patient has been battling with hypoglycemia since starting the glimepiride 1mg before breakfast. She is feeling terrible and BS tends to swing high around lunch/dinner. She feels terrible for the whole day when running too low. She may benefit from changing to an SGLT2 or GLP1   If she can bring me BS logs I will try to get her the Dexcom G7 covered.  I am pending the prescription for G7 sensors if you don't mind sending to the pharmacy.    Thank you

## 2023-12-08 NOTE — PROGRESS NOTES
"Diabetes Care Specialist Follow-up Note  Author: Cheri Horton RD, CDE  Date: 12/8/2023    Program Intake  Reason for Diabetes Program Visit:: Intervention  Type of Intervention:: Individual  Individual: Education  Education: Nutrition and Meal Planning, Pattern Management  Device Training: Personal CGM  Current diabetes risk level:: low (HgbA1c 6.8)  In the last 12 months, have you:: none  Permission to speak with others about care:: no    Lab Results   Component Value Date    HGBA1C 6.8 (H) 09/09/2023     A1c Pre Diabetes Care Specialist Intervention:  6.8%    Clinical    Weight: 70.3 kg (155 lb)   Height: 5' 4" (162.6 cm)   Body mass index is 26.61 kg/m².  Wt loss of 9 lbs since last visit on 10/17/2023    Problem Review  Reviewed health maintenance: yes    Clinical Assessment  Current Diabetes Treatment: Oral Medication (glimepiride)  Have you ever experienced hypoglycemia (low blood sugar)?: yes  In the last month, how often have you experienced low blood sugar?: more than once a week  Are you able to tell when your blood sugar is low?: Yes  What symptoms do you experience?: Dizzy/Light-headed, Shaky, Anxious/nervous, Nausea  Have you ever been hospitalized because your blood sugar was too low?: no  How do you treat hypoglycemia (low blood sugar)?: 1/2 can soda/fruit juice, 5-6 pieces of hard candy  Have you ever experienced hyperglycemia (high blood sugar)?: yes  In the last month, how often have you experienced high blood sugar?: more than once a week  Are you able to tell when your blood sugar is high?: Yes  What are your symptoms?: fatigue  Have you ever been hospitalized because your blood sugar was high?: no    Medication Information  How do you obtain your medications?: Patient drives, Family picks up  How many days a week do you miss your medications?: Never  Do you use a pill box or medication chart to help you manage your medications?: No  Do you sometimes have difficulty refilling your medications?: " No (did not purchase the trulicity, stopped the metformin (thought it was causing hypoglycemia), stayed on the glimepiride)  Medication adherence impacting ability to self-manage diabetes?: No    Labs  Do you have regular lab work to monitor your medications?: Yes  Type of Regular Lab Work: A1c, Cholesterol, Microalbumin, CBC, BMP  Where do you get your labs drawn?: [facility]  Lab Compliance Barriers: No    Nutritional Status  Diet: Diabetic diet, Low cholesterol, Low sodium, Low fat, High fiber  Meal Plan 24 Hour Recall: Breakfast, Lunch, Dinner, Snack  Meal Plan 24 Hour Recall - Breakfast: skipped or will just have eggs  Meal Plan 24 Hour Recall - Lunch: 1/2 turkey sandwich on sliced bread, tried a salad with turkey and light Italian dressing but it affected BP  Meal Plan 24 Hour Recall - Dinner: 1/2 turkey sandwich on sliced bread  Meal Plan 24 Hour Recall - Snack: none  Change in appetite?: No  Dentation:: Intact  Recent Changes in Weight: Weight Loss  Was weight loss intentional or unintentional?: Intentional  Current nutritional status an area of need that is impacting patient's ability to self-manage diabetes?: No    Physical activity/Exercise:   Not much    SMBG: testing multiple times a day (8+ times)  Fasting, 50s-200s  Post prandial, 50-200s  BS 65 at 11:34am then went up to 214, back down to 211, then 199, now in office 148, at end of visit 112    Additional Social History    Support  Is Support an area impacting ability to self-manage diabetes?: No    Access to Mass Media & Technology  Media or technology needs impacting ability to self-manage diabetes?: No    Cognitive/Behavioral Health  Cognitive or behavioral barriers impacting ability to self-manage diabetes?: No    Culture/Judaism  Culture or Adventism beliefs that may impact ability to access healthcare: No    Communication  Communication needs impacting ability to self-manage diabetes?: No    Health Literacy  Health literacy needs impacting  ability to self-manage diabetes?: No    Still afraid to eat anything because it will increase BS and/or BP  Lost an additional 9 lbs being afraid to eat, up to 39 lbs lost to date  Feeling terrible, has been to the ED at least 3 times this week for hypoglycemia  With some foods feels like chest/heart is coming up, especially when active      Diabetes Self-Management Skills Assessment     Diabetes Disease Process/Treatment Options  Patient/caregiver able to state what happens when someone has diabetes.: yes  Patient/caregiver knows what type of diabetes they have.: yes  Diabetes Type : Type II  Patient/caregiver able to identify at least three signs and symptoms of diabetes.: yes  Identified signs and symptoms:: fatigue, blurred vision  Patient able to identify at least three risk factors for diabetes.: yes  Identified risk factors:: history of gestational diabetes, family history  Diabetes Disease Process/Treatment Options: Skills Assessment Completed: Yes  Assessment indicates:: Adequate understanding  Area of need?: No    Nutrition/Healthy Eating  Method of carbohydrate measurement:: other (see comments), measuring cups/spoons (eliminating lots of foods)  Patient can identify foods that impact blood sugar.: yes  Patient-identified foods:: sweets, starches (bread, pasta, rice, cereal), fruit/fruit juice, milk, soda, starchy vegetables (corn, peas, beans), yogurt  Nutrition/Healthy Eating Skills Assessment Completed:: Yes  Assessment indicates:: Instruction Needed, Adequate understanding  Area of need?: Yes    Physical Activity/Exercise  Patient's daily activity level:: lightly active  Patient formally exercises outside of work.: no  Reasons for not exercising:: work schedule, other (see comments) (constantly feeling bad)  Patient can identify forms of physical activity.: yes  Stated forms of physical activity:: other (see comments) (walking, going to the gym, weight training)  Patient can identify reasons why  exercise/physical activity is important in diabetes management.: no  Physical Activity/Exercise Skills Assessment Completed: : Yes  Assessment indicates:: Adequate understanding  Area of need?: No    Medications  Patient is able to describe current diabetes management routine.: yes  Diabetes management routine:: diet, oral medications (switched to Dr. Potter, stopped the metformin because she though it was causing hypoglycemia, never started the trulicity, taking glimepiride and not eating leading to hypoglycemia - visited the ED 3-4 times this week)  Patient is able to identify current diabetes medications, dosages, and appropriate timing of medications.: yes  Patient understands the purpose of the medications taken for diabetes.: no  Patient reports problems or concerns with current medication regimen.: yes  Medication regimen problems/concerns:: concerned about side effects, does not feel like regimen is working  Medication Skills Assessment Completed:: Yes  Assessment indicates:: Instruction Needed  Area of need?: Yes    Home Blood Glucose Monitoring  Patient states that blood sugar is checked at home daily.: yes  Monitoring Method:: personal continuous glucose monitor, home glucometer  Home glucometer meter type:: one touch  How often do you check your blood sugar?: Other (comment) (more than 8 times a day)  When do you check your blood sugar?: Before breakfast, Before lunch, Before dinner, 2 hours after meal, Before bedtime  When you check what is your typical blood sugar range? : 50-200s  Blood glucose logs:: no  Blood glucose logs reviewed today?: no  Personal CGM type:: dexcom G7  Patient is able to use personal CGM appropriately.: yes  CGM Report reviewed?: no  Unable to download personal CGM: has not had the dexcom G7, restarting with a sample at time of visit  Home Blood Glucose Monitoring Skills Assessment Completed: : Yes  Assessment indicates:: Adequate understanding  Area of need?: Yes    Acute  Complications  Patient is able to identify types of acute complications: Yes  Patient Identified:: Hypoglycemia, Hyperglycemia  Patient is able to state the basic meaning of hypoglycemia?: Yes  Able to state the blood sugar range for hypoglycemia?: yes  Patient stated range:: <70  Patient can identify general symptoms of hypoglycemia: yes  Patient identified:: anxiety, dizziness, fatigue, nausea, rapid heartbeat, shakiness  Able to state proper treatment of hypoglycemia?: no (see comments)  Patient identified:: other (see comments) (tried a diet mountain dew and then candy)  Patient is able to state the basic meaning of hyperglycemia?: Yes  Able to state the blood sugar range for hyperglycemia?: yes  Patient stated range:: >200  Patient able to state proper treatment of hyperglycemia?: no (see comments)  Patient able to verbalize sick day plan?: no  Acute Complications Skills Assessment Completed: : Yes  Assessment indicates:: Instruction Needed  Area of need?: Yes    Chronic Complications  Patient can identify major chronic complications of diabetes.: yes  Stated chronic complications:: retinopathy, kidney disease  Patient can identify ways to prevent or delay diabetes complications.: yes  Stated ways to prevent complications:: controlling blood sugar  Patient is aware that having diabetes increases risk of heart disease?: No  Patient is aware that heart disease is the leading cause of death and disability in people with diabetes?: No  Patient able to state risk factors for heart disease?: Yes  Patient stated risk factors for heart disease:: High blood pressure, High cholesterol  Patient is taking statin?: Yes (zocor)  Chronic Complications Skills Assessment Completed: : Yes  Assessment indicates:: Instruction Needed, Adequate understanding  Area of need?: Yes    Psychosocial/Coping  Patient can identify ways of coping with chronic disease.: yes  Patient-stated ways of coping with chronic disease:: support from  loved ones, counseling/actively seeing behavioral professional, support group  Psychosocial/Coping Skills Assessment Completed: : Yes  Assessment indicates:: Instruction Needed  Area of need?: Yes      During today's follow-up visit,  the following areas required further assessment and content was provided/reviewed.    Based on today's diabetes care assessment, the following areas of need were identified:          12/7/2023    12:01 AM   Social   Support No   Access to Mass Media/Tech No   Cognitive/Behavioral Health No   Culture/Spiritism No   Communication No   Health Literacy No            12/7/2023    12:01 AM   Clinical   Medication Adherence No   Lab Compliance No   Nutritional Status No            12/7/2023    12:01 AM   Diabetes Self-Management Skills   Diabetes Disease Process/Treatment Options No, Discussed pathology of Type 1 vs type 2 diabetes, risk factors and treatment options.    Nutrition/Healthy Eating Yes, reviewed some, focused on meal timing and medication, instructed to include carbohydrates because glimepiride is causing her to produce more insulin and if she is not eating any carbohydrates she will experience hypoglycemia   Physical Activity/Exercise No   Medication Yes, Discussed MOA, onset, side effects, dosage of meds: glimepiride, also reviewed metformin, GLP1s, and SGLT2s. Message sent to PCP to see about changing medication and getting a prescription for the dexcom G7 due to hypoglycemia  CGM will allow us to remotely monitor patients glucose levels and will help us make any necessary adjustments to their diabetes regimen; while keeping our patients safe and our staff informed during the PHE  Patient has diabetes mellitus and manages diabetes with intensive insulin regimen and uses prandial and basal insulin daily  Patient requires a therapeutic CGM and is willing to use therapeutic CGM for the necessary frequent adjustments of insulin therapy.  I have completed an in-person visit with  PCP and DM provider during the previous 6 months and will continue to have in-person visits every 6 months to assess adherence to their CGM regimen and diabetes treatment plan.   Home Blood Glucose Monitoring Yes, instructed to test glucose once a day and then when feeling bad, patient is causing unnecessary anxiety testing more than 8 times a day. Diabetes is controlling her rather than she controlling it. She is already an anxious person and this is complicating the situation.   Acute Complications Yes, Reviewed blood glucose goals, prevention, detection, signs and symptoms, and treatment of hypoglycemia and hyperglycemia, and when to contact the clinic.   Chronic Complications Yes, Discussed importance of A1c less than 7 to reduce risk of micro and macro complications, including nephropathy, neuropathy, retinopathy, heart attack and stroke. Reviewed the importance of controlled Blood Pressure and Cholesterol Lab Values in preventing disease. Health maintenance schedule reviewed   Psychosocial/Coping Yes, reviewed ways to cope with chronic disease, patient is feeling like she can't function, this is affecting her job, she has been to the ED multiple times this week, could benefit from talking with a provider to help develop coping skills  Covered the following coping strategies to help deal with chronic disease.   Provided current list of diabetes support group meeting locations and contact information, written online and community resources   Discussed role stress on blood sugar control and diabetes health   Discussed role sleep on health and diabetes control  Discussed feelings associated with chronic disease management  Consider talking with family or close friend  Consider talking with certified counselor, psychologist or psychiatrist  Recommended ways to reduce stress such as: reading a book or magazine, enjoy a cup of tea or coffee, take a walk, journal, draw or color, paint, play an instrument, singing,  gardening, crafting, woodwork, prayer, meditation, yoga or phi chi, develop a sleep schedule         Today's interventions were provided through individual discussion, instruction, and written materials were provided.    Patient verbalized understanding of instruction and written materials.  Pt was able to return back demonstration of instructions today. Patient understood key points, needs reinforcement and further instruction.     Diabetes Self-Management Care Plan Review and Evaluation of Progress:    During today's follow-up Lacey Barnes's Diabetes Self-Management Care Plan progress was reviewed and progress was evaluated including his/her input. Lacey Barnes has agreed to continue his/her journey to improve/maintain overall diabetes control by continuing to set health goals. See care plan progress below.      Care Plan: Diabetes Management   Updates made since 11/8/2023 12:00 AM        Problem: Blood Glucose Self-Monitoring         Goal: Patient agrees to check blood sugars atleast once per day using the dexcom G7 for the next 3 months.    Start Date: 10/2/2023   Expected End Date: 1/3/2024   This Visit's Progress: Not met   Recent Progress: On track   Priority: High   Barriers: No Barriers Identified          Follow Up Plan     Follow up in about 3 months (around 3/7/2024) for Personal CGM Upload, General Follow-up.    Today's care plan and follow up schedule was discussed with patient.  Lacey Barnes verbalized understanding of the care plan, goals, and agrees to follow up plan.        The patient was encouraged to communicate with his/her health care provider/physician and care team regarding his/her condition(s) and treatment.  I provided the patient with my contact information today and encouraged to contact me via phone or [facility] Portal as needed.     Length of Visit   Total Time: 80 Minutes

## 2023-12-11 ENCOUNTER — OFFICE VISIT (OUTPATIENT)
Dept: PRIMARY CARE CLINIC | Facility: CLINIC | Age: 53
End: 2023-12-11
Payer: COMMERCIAL

## 2023-12-11 ENCOUNTER — LAB VISIT (OUTPATIENT)
Dept: LAB | Facility: HOSPITAL | Age: 53
End: 2023-12-11
Payer: COMMERCIAL

## 2023-12-11 VITALS
DIASTOLIC BLOOD PRESSURE: 70 MMHG | WEIGHT: 150.13 LBS | OXYGEN SATURATION: 100 % | BODY MASS INDEX: 25.63 KG/M2 | SYSTOLIC BLOOD PRESSURE: 116 MMHG | HEIGHT: 64 IN | HEART RATE: 82 BPM

## 2023-12-11 DIAGNOSIS — E78.5 TYPE 2 DIABETES MELLITUS WITH HYPERLIPIDEMIA: ICD-10-CM

## 2023-12-11 DIAGNOSIS — Z76.89 ENCOUNTER TO ESTABLISH CARE WITH NEW DOCTOR: ICD-10-CM

## 2023-12-11 DIAGNOSIS — E11.69 TYPE 2 DIABETES MELLITUS WITH HYPERLIPIDEMIA: Primary | ICD-10-CM

## 2023-12-11 DIAGNOSIS — E11.69 TYPE 2 DIABETES MELLITUS WITH HYPERLIPIDEMIA: ICD-10-CM

## 2023-12-11 DIAGNOSIS — E78.5 TYPE 2 DIABETES MELLITUS WITH HYPERLIPIDEMIA: Primary | ICD-10-CM

## 2023-12-11 LAB
ESTIMATED AVG GLUCOSE: 126 MG/DL (ref 68–131)
HBA1C MFR BLD: 6 % (ref 4–5.6)

## 2023-12-11 PROCEDURE — 3008F BODY MASS INDEX DOCD: CPT | Mod: CPTII,S$GLB,,

## 2023-12-11 PROCEDURE — 3066F NEPHROPATHY DOC TX: CPT | Mod: CPTII,S$GLB,,

## 2023-12-11 PROCEDURE — 3066F PR DOCUMENTATION OF TREATMENT FOR NEPHROPATHY: ICD-10-PCS | Mod: CPTII,S$GLB,,

## 2023-12-11 PROCEDURE — 3078F PR MOST RECENT DIASTOLIC BLOOD PRESSURE < 80 MM HG: ICD-10-PCS | Mod: CPTII,S$GLB,,

## 2023-12-11 PROCEDURE — 99417 PROLNG OP E/M EACH 15 MIN: CPT | Mod: S$GLB,,,

## 2023-12-11 PROCEDURE — 3044F PR MOST RECENT HEMOGLOBIN A1C LEVEL <7.0%: ICD-10-PCS | Mod: CPTII,S$GLB,,

## 2023-12-11 PROCEDURE — 99999 PR PBB SHADOW E&M-EST. PATIENT-LVL IV: CPT | Mod: PBBFAC,,,

## 2023-12-11 PROCEDURE — 99205 OFFICE O/P NEW HI 60 MIN: CPT | Mod: S$GLB,,,

## 2023-12-11 PROCEDURE — 3074F SYST BP LT 130 MM HG: CPT | Mod: CPTII,S$GLB,,

## 2023-12-11 PROCEDURE — 3078F DIAST BP <80 MM HG: CPT | Mod: CPTII,S$GLB,,

## 2023-12-11 PROCEDURE — 3044F HG A1C LEVEL LT 7.0%: CPT | Mod: CPTII,S$GLB,,

## 2023-12-11 PROCEDURE — 83036 HEMOGLOBIN GLYCOSYLATED A1C: CPT

## 2023-12-11 PROCEDURE — 3008F PR BODY MASS INDEX (BMI) DOCUMENTED: ICD-10-PCS | Mod: CPTII,S$GLB,,

## 2023-12-11 PROCEDURE — 3074F PR MOST RECENT SYSTOLIC BLOOD PRESSURE < 130 MM HG: ICD-10-PCS | Mod: CPTII,S$GLB,,

## 2023-12-11 PROCEDURE — 99205 PR OFFICE/OUTPT VISIT, NEW, LEVL V, 60-74 MIN: ICD-10-PCS | Mod: S$GLB,,,

## 2023-12-11 PROCEDURE — 1159F MED LIST DOCD IN RCRD: CPT | Mod: CPTII,S$GLB,,

## 2023-12-11 PROCEDURE — 1159F PR MEDICATION LIST DOCUMENTED IN MEDICAL RECORD: ICD-10-PCS | Mod: CPTII,S$GLB,,

## 2023-12-11 PROCEDURE — 99417 PR PROLONGED SVC, OUTPT, W/WO DIRECT PT CONTACT,  EA ADDTL 15 MIN: ICD-10-PCS | Mod: S$GLB,,,

## 2023-12-11 PROCEDURE — 3061F PR NEG MICROALBUMINURIA RESULT DOCUMENTED/REVIEW: ICD-10-PCS | Mod: CPTII,S$GLB,,

## 2023-12-11 PROCEDURE — 36415 COLL VENOUS BLD VENIPUNCTURE: CPT | Mod: PN

## 2023-12-11 PROCEDURE — 99999 PR PBB SHADOW E&M-EST. PATIENT-LVL IV: ICD-10-PCS | Mod: PBBFAC,,,

## 2023-12-11 PROCEDURE — 3061F NEG MICROALBUMINURIA REV: CPT | Mod: CPTII,S$GLB,,

## 2023-12-11 NOTE — PROGRESS NOTES
[facility] Note    Chief Complaint      Chief Complaint   Patient presents with    Blood Sugar Problem     History of Present Illness      Lacey Lange is a 53 y.o. female patient of Dr. Potter who presents today for  dizziness, occasional nausea, not sleeping at night x several days.  This pt is new to me.      Pt appears anxious during visit.   Stated she is not sleeping at night, and checking her dexcom several times during the day.  Thursday of last week, she panicked when her 1130am BG was 65. She decided to drink a Mountain Dew and ate a sandwich.  Her BG increased to 214 around 2:30pm, causing her to become more nervious.   Friday morning, she did not take her glimepiride, but ate breakfast, then she became nervous because  just before lunch time.  Pt stated she took her medication on Saturday, Sunday and this morning.  Pt ate breakfast this morning.  Her BG was 111 during today's visit.    Denies: HA, visual disturbances, slurred speech, weakness        Health Maintenance   Topic Date Due    Eye Exam  Never done    Colorectal Cancer Screening  Never done    Shingles Vaccine (1 of 2) Never done    TETANUS VACCINE  10/29/2023    Hemoglobin A1c  03/09/2024    Mammogram  08/24/2024    Lipid Panel  09/09/2024    Foot Exam  09/21/2024    Low Dose Statin  12/04/2024    Hepatitis C Screening  Completed       Past Medical History:   Diagnosis Date    Abnormal cervical Papanicolaou smear 01/01/2014    Colposcopy    Anemia     Gestational diabetes     Hypertension        Past Surgical History:   Procedure Laterality Date    COLPOSCOPY      DILATION AND CURETTAGE OF UTERUS         family history includes Breast cancer in her cousin.    Social History     Tobacco Use    Smoking status: Never    Smokeless tobacco: Never   Substance Use Topics    Alcohol use: No    Drug use: No       Review of Systems   Constitutional: Negative.    Eyes:  Negative for blurred vision and double vision.    Respiratory: Negative.     Cardiovascular: Negative.    Gastrointestinal: Negative.    Genitourinary: Negative.    Musculoskeletal: Negative.    Skin: Negative.    Neurological:  Positive for dizziness.   Psychiatric/Behavioral:  The patient is nervous/anxious and has insomnia.         Outpatient Encounter Medications as of 12/11/2023   Medication Sig Dispense Refill    amLODIPine (NORVASC) 5 MG tablet Take 1 tablet (5 mg total) by mouth once daily. 90 tablet 0    CMPD hydrocortisone 2%- LIDOcaine 3% suppository (RECTAL ROCKET) Place 1 suppository rectally every evening. Insert 1 suppository 3/4 of the way onto rectum. Wet for easier application. Repeat for 3 nights. 10 suppository 0    DEXCOM G7 SENSOR Nan 1 Device by Misc.(Non-Drug; Combo Route) route every 10 days. 3 each 11    glimepiride (AMARYL) 1 MG tablet Take 1 mg by mouth before breakfast.      glycerin adult suppository Place 1 suppository rectally as needed for Constipation. 25 suppository 0    ondansetron (ZOFRAN-ODT) 4 MG TbDL Take 1 tablet (4 mg total) by mouth every 6 (six) hours as needed (nausea). 20 tablet 1    ONETOUCH DELICA PLUS LANCET 33 gauge Misc 1 lancet  by Other route 3 (three) times daily. 100 each 11    ONETOUCH ULTRA TEST Strp 1 strip by Other route 3 (three) times daily. 100 each 11    polyethylene glycol (GLYCOLAX) 17 gram PwPk Take 17 g by mouth once daily. 14 packet 0    simvastatin (ZOCOR) 10 MG tablet Take 1 tablet (10 mg total) by mouth every evening. 90 tablet 3    sodium phosphates (FLEET ENEMA EXTRA) 19-7 gram/197 mL Enem Place 1 Bottle rectally daily as needed. 1 enema 0     Facility-Administered Encounter Medications as of 12/11/2023   Medication Dose Route Frequency Provider Last Rate Last Admin    cyanocobalamin injection 1,000 mcg  1,000 mcg Intramuscular Q7 Days Alfonso Potter MD   1,000 mcg at 12/04/23 1447        Review of patient's allergies indicates:  No Known Allergies    Physical Exam      Vital  "Signs  Pulse: 82  SpO2: 100 %  BP: 116/70  BP Location: Left arm  Patient Position: Sitting  Pain Score: 0-No pain  Height and Weight  Height: 5' 4" (162.6 cm)  Weight: 68.1 kg (150 lb 2.1 oz)  BSA (Calculated - sq m): 1.75 sq meters  BMI (Calculated): 25.8  Weight in (lb) to have BMI = 25: 145.3    Physical Exam  Constitutional:       Appearance: Normal appearance.   HENT:      Head: Normocephalic and atraumatic.   Cardiovascular:      Rate and Rhythm: Normal rate and regular rhythm.      Pulses: Normal pulses.      Heart sounds: Normal heart sounds.   Pulmonary:      Effort: Pulmonary effort is normal.      Breath sounds: Normal breath sounds.   Skin:     General: Skin is warm and dry.      Capillary Refill: Capillary refill takes less than 2 seconds.   Neurological:      General: No focal deficit present.      Mental Status: She is oriented to person, place, and time.   Psychiatric:         Mood and Affect: Mood is anxious.         Behavior: Behavior is agitated.         Thought Content: Thought content normal.          Laboratory:  CBC:  Lab Results   Component Value Date    WBC 2.11 (L) 12/09/2023    RBC 3.03 (L) 12/09/2023    HGB 8.9 (L) 12/09/2023    HCT 28.2 (L) 12/09/2023     (L) 12/09/2023    MCV 93 12/09/2023    MCH 29.4 12/09/2023    MCHC 31.6 (L) 12/09/2023    MCHC 31.8 (L) 12/06/2023    MCHC 31.4 (L) 11/28/2023     CMP:  Lab Results   Component Value Date     (H) 12/06/2023    CALCIUM 9.8 12/06/2023    ALBUMIN 4.3 12/06/2023    PROT 8.6 (H) 12/06/2023     12/06/2023    K 3.4 (L) 12/06/2023    CO2 21 (L) 12/06/2023     12/06/2023    BUN 13 12/06/2023    ALKPHOS 66 12/06/2023    ALT 18 12/06/2023    AST 36 12/06/2023    BILITOT 1.0 12/06/2023    BILITOT 1.2 (H) 11/28/2023    BILITOT 0.9 11/26/2023     URINALYSIS:  Lab Results   Component Value Date    COLORU Colorless (A) 11/26/2023    SPECGRAV <1.005 (A) 11/26/2023    PHUR 6.0 11/26/2023    PROTEINUA Negative 11/26/2023    " BACTERIA Rare 11/03/2023    NITRITE Negative 11/26/2023    LEUKOCYTESUR 2+ (A) 11/26/2023    UROBILINOGEN Negative 11/26/2023    HYALINECASTS 3 (A) 09/09/2023      LIPIDS:  Lab Results   Component Value Date    TSH 1.229 12/06/2023    TSH 1.080 11/26/2023    TSH 0.877 09/09/2023    HDL 39 (L) 09/09/2023    CHOL 165 09/09/2023    TRIG 54 09/09/2023    LDLCALC 115.2 09/09/2023    CHOLHDL 23.6 09/09/2023    NONHDLCHOL 126 09/09/2023    TOTALCHOLEST 4.2 09/09/2023     TSH:  Lab Results   Component Value Date    TSH 1.229 12/06/2023    TSH 1.080 11/26/2023    TSH 0.877 09/09/2023     A1C:  Lab Results   Component Value Date    HGBA1C 6.8 (H) 09/09/2023    HGBA1C 6.0 (H) 02/11/2019    HGBA1C 6.3 (H) 07/11/2013         Assessment/Plan     Lacey Lange is a 53 y.o.female with:  Anxiety related to diabetes and blood sugar control.  Encouraged patient to perform deep breathing exercises  Encouraged patient to only check Dexcom 3 times a day.  Discussed normal BG range, diabetic meal planning, how to keep track of blood sugar.  Discussed exercise and its importance in maintaining blood glucose levels  Discussed tips for better sleep, sleep insufficiency, and what patient can do to get a better night sleep.    1. Type 2 diabetes mellitus with hyperlipidemia  -     HEMOGLOBIN A1C; Future; Expected date: 12/11/2023    2. Encounter to establish care with new doctor  -     Ambulatory referral/consult to Obstetrics / Gynecology; Future; Expected date: 12/18/2023       Established. Time spent in the evaluation and management of this patient exceeded 40 min and greater than 50% of this time was in face-to-face education regarding diagnoses, medications, plan, and follow-up.    -Continue current medications and maintain follow up with specialists.  Return to clinic in Follow up if symptoms worsen or fail to improve.       Florence Pettit NP  [facility] Location    Portions of this note may have been  generated using voice recognition software.  Please excuse any spelling/grammatical errors. Occasional wrong-word or sound-a-like substitutions may have also occurred due to the inherent limitations of voice recognition software. Please read the chart carefully and recognize, using context, where substitutions have occurred.

## 2023-12-12 ENCOUNTER — ON-DEMAND VIRTUAL (OUTPATIENT)
Dept: URGENT CARE | Facility: CLINIC | Age: 53
End: 2023-12-12
Payer: COMMERCIAL

## 2023-12-12 ENCOUNTER — CLINICAL SUPPORT (OUTPATIENT)
Dept: PRIMARY CARE CLINIC | Facility: CLINIC | Age: 53
End: 2023-12-12
Payer: COMMERCIAL

## 2023-12-12 ENCOUNTER — NURSE TRIAGE (OUTPATIENT)
Dept: ADMINISTRATIVE | Facility: CLINIC | Age: 53
End: 2023-12-12
Payer: COMMERCIAL

## 2023-12-12 DIAGNOSIS — R03.0 TRANSIENT ELEVATED BLOOD PRESSURE: Primary | ICD-10-CM

## 2023-12-12 DIAGNOSIS — E53.8 B12 DEFICIENCY: Primary | ICD-10-CM

## 2023-12-12 PROCEDURE — 96372 THER/PROPH/DIAG INJ SC/IM: CPT | Mod: S$GLB,,, | Performed by: FAMILY MEDICINE

## 2023-12-12 PROCEDURE — 99999 PR PBB SHADOW E&M-EST. PATIENT-LVL I: ICD-10-PCS | Mod: PBBFAC,,,

## 2023-12-12 PROCEDURE — 99213 PR OFFICE/OUTPT VISIT, EST, LEVL III, 20-29 MIN: ICD-10-PCS | Mod: 95,,, | Performed by: FAMILY MEDICINE

## 2023-12-12 PROCEDURE — 96372 PR INJECTION,THERAP/PROPH/DIAG2ST, IM OR SUBCUT: ICD-10-PCS | Mod: S$GLB,,, | Performed by: FAMILY MEDICINE

## 2023-12-12 PROCEDURE — 99999 PR PBB SHADOW E&M-EST. PATIENT-LVL I: CPT | Mod: PBBFAC,,,

## 2023-12-12 PROCEDURE — 99213 OFFICE O/P EST LOW 20 MIN: CPT | Mod: 95,,, | Performed by: FAMILY MEDICINE

## 2023-12-12 RX ADMIN — CYANOCOBALAMIN 1000 MCG: 1000 INJECTION, SOLUTION INTRAMUSCULAR; SUBCUTANEOUS at 10:12

## 2023-12-12 NOTE — PROGRESS NOTES
Verified pt by name and . NKDA. Per physician orders pt was administered B 12 1000 mcg IM to right deltoid using aseptic technique. Pt tolerated well. No adverse effects or pain reported. MD notified.

## 2023-12-12 NOTE — LETTER
December 12, 2023      Virtual Visit - Urgent Care  [address] [facility] [address]       Patient: Lacey Lange   YOB: 1970  Date of Visit: 12/12/2023    To Whom It May Concern:    Lea Lange  was at [facility] on 12/12/2023. The patient may return to work/school on 12/16/2023  with no restrictions. If you have any questions or concerns, or if I can be of further assistance, please do not hesitate to contact me.    Sincerely,    Carmel Vargas MD

## 2023-12-12 NOTE — TELEPHONE ENCOUNTER
"Pt reports feeling bad after getting a B12 shot today, feels weak and dizzy, checked her blood sugar and it is 92 after eating a sandwich, pt states at work she checked her BP and it was 208/123 so she went home, retook her BP at 1430 and it was 118/78. Pt states she is able to walk, but just feels 'off.' Pt states that she is seen in one MD office or another pretty frequently and is told that she is fine, doesn't really want to go back to an UC/ED to be seen within the next 4 hours as advised per protocol, discussed doing an ODVV, pt states she might do that. Pt encouraged to call back with any worsening symptoms or questions. Pt verbalized understanding.    Reason for Disposition   [1] MODERATE weakness (i.e., interferes with work, school, normal activities) AND [2] cause unknown  (Exceptions: Weakness from acute minor illness or poor fluid intake; weakness is chronic and not worse.)    Additional Information   Negative: SEVERE difficulty breathing (e.g., struggling for each breath, speaks in single words)   Negative: Shock suspected (e.g., cold/pale/clammy skin, too weak to stand, low BP, rapid pulse)   Negative: Difficult to awaken or acting confused (e.g., disoriented, slurred speech)   Negative: [1] Fainted > 15 minutes ago AND [2] still feels too weak or dizzy to stand   Negative: [1] SEVERE weakness (i.e., unable to walk or barely able to walk, requires support) AND [2] new-onset or worsening   Negative: Sounds like a life-threatening emergency to the triager   Negative: Difficulty breathing   Negative: Heart beating < 50 beats per minute OR > 140 beats per minute   Negative: Extra heartbeats, irregular heart beating, or heart is beating very fast  (i.e., "palpitations")   Negative: Follows large amount of bleeding (e.g., from vomiting, rectum, vagina  (Exception: Small brief weakness from sight of a small amount blood.)   Negative: Black or tarry bowel movements   Negative: [1] Drinking very little AND [2] " dehydration suspected (e.g., no urine > 12 hours, very dry mouth, very lightheaded)   Negative: Patient sounds very sick or weak to the triager    Protocols used: Weakness (Generalized) and Fatigue-A-AH

## 2023-12-13 ENCOUNTER — OFFICE VISIT (OUTPATIENT)
Dept: PRIMARY CARE CLINIC | Facility: CLINIC | Age: 53
End: 2023-12-13
Payer: COMMERCIAL

## 2023-12-13 ENCOUNTER — PATIENT OUTREACH (OUTPATIENT)
Dept: ADMINISTRATIVE | Facility: HOSPITAL | Age: 53
End: 2023-12-13
Payer: COMMERCIAL

## 2023-12-13 VITALS
HEIGHT: 64 IN | TEMPERATURE: 98 F | DIASTOLIC BLOOD PRESSURE: 72 MMHG | RESPIRATION RATE: 18 BRPM | SYSTOLIC BLOOD PRESSURE: 136 MMHG | OXYGEN SATURATION: 99 % | WEIGHT: 151.88 LBS | HEART RATE: 89 BPM | BODY MASS INDEX: 25.93 KG/M2

## 2023-12-13 DIAGNOSIS — E78.5 TYPE 2 DIABETES MELLITUS WITH HYPERLIPIDEMIA: Primary | ICD-10-CM

## 2023-12-13 DIAGNOSIS — E11.69 TYPE 2 DIABETES MELLITUS WITH HYPERLIPIDEMIA: Primary | ICD-10-CM

## 2023-12-13 PROCEDURE — 3066F NEPHROPATHY DOC TX: CPT | Mod: CPTII,S$GLB,, | Performed by: STUDENT IN AN ORGANIZED HEALTH CARE EDUCATION/TRAINING PROGRAM

## 2023-12-13 PROCEDURE — 3008F PR BODY MASS INDEX (BMI) DOCUMENTED: ICD-10-PCS | Mod: CPTII,S$GLB,, | Performed by: STUDENT IN AN ORGANIZED HEALTH CARE EDUCATION/TRAINING PROGRAM

## 2023-12-13 PROCEDURE — 99214 PR OFFICE/OUTPT VISIT, EST, LEVL IV, 30-39 MIN: ICD-10-PCS | Mod: S$GLB,,, | Performed by: STUDENT IN AN ORGANIZED HEALTH CARE EDUCATION/TRAINING PROGRAM

## 2023-12-13 PROCEDURE — 3066F PR DOCUMENTATION OF TREATMENT FOR NEPHROPATHY: ICD-10-PCS | Mod: CPTII,S$GLB,, | Performed by: STUDENT IN AN ORGANIZED HEALTH CARE EDUCATION/TRAINING PROGRAM

## 2023-12-13 PROCEDURE — 99214 OFFICE O/P EST MOD 30 MIN: CPT | Mod: S$GLB,,, | Performed by: STUDENT IN AN ORGANIZED HEALTH CARE EDUCATION/TRAINING PROGRAM

## 2023-12-13 PROCEDURE — 1159F PR MEDICATION LIST DOCUMENTED IN MEDICAL RECORD: ICD-10-PCS | Mod: CPTII,S$GLB,, | Performed by: STUDENT IN AN ORGANIZED HEALTH CARE EDUCATION/TRAINING PROGRAM

## 2023-12-13 PROCEDURE — 1159F MED LIST DOCD IN RCRD: CPT | Mod: CPTII,S$GLB,, | Performed by: STUDENT IN AN ORGANIZED HEALTH CARE EDUCATION/TRAINING PROGRAM

## 2023-12-13 PROCEDURE — 3044F HG A1C LEVEL LT 7.0%: CPT | Mod: CPTII,S$GLB,, | Performed by: STUDENT IN AN ORGANIZED HEALTH CARE EDUCATION/TRAINING PROGRAM

## 2023-12-13 PROCEDURE — 3061F NEG MICROALBUMINURIA REV: CPT | Mod: CPTII,S$GLB,, | Performed by: STUDENT IN AN ORGANIZED HEALTH CARE EDUCATION/TRAINING PROGRAM

## 2023-12-13 PROCEDURE — 1160F RVW MEDS BY RX/DR IN RCRD: CPT | Mod: CPTII,S$GLB,, | Performed by: STUDENT IN AN ORGANIZED HEALTH CARE EDUCATION/TRAINING PROGRAM

## 2023-12-13 PROCEDURE — 3078F DIAST BP <80 MM HG: CPT | Mod: CPTII,S$GLB,, | Performed by: STUDENT IN AN ORGANIZED HEALTH CARE EDUCATION/TRAINING PROGRAM

## 2023-12-13 PROCEDURE — 3044F PR MOST RECENT HEMOGLOBIN A1C LEVEL <7.0%: ICD-10-PCS | Mod: CPTII,S$GLB,, | Performed by: STUDENT IN AN ORGANIZED HEALTH CARE EDUCATION/TRAINING PROGRAM

## 2023-12-13 PROCEDURE — 3078F PR MOST RECENT DIASTOLIC BLOOD PRESSURE < 80 MM HG: ICD-10-PCS | Mod: CPTII,S$GLB,, | Performed by: STUDENT IN AN ORGANIZED HEALTH CARE EDUCATION/TRAINING PROGRAM

## 2023-12-13 PROCEDURE — 3061F PR NEG MICROALBUMINURIA RESULT DOCUMENTED/REVIEW: ICD-10-PCS | Mod: CPTII,S$GLB,, | Performed by: STUDENT IN AN ORGANIZED HEALTH CARE EDUCATION/TRAINING PROGRAM

## 2023-12-13 PROCEDURE — 99999 PR PBB SHADOW E&M-EST. PATIENT-LVL V: CPT | Mod: PBBFAC,,, | Performed by: STUDENT IN AN ORGANIZED HEALTH CARE EDUCATION/TRAINING PROGRAM

## 2023-12-13 PROCEDURE — 3075F PR MOST RECENT SYSTOLIC BLOOD PRESS GE 130-139MM HG: ICD-10-PCS | Mod: CPTII,S$GLB,, | Performed by: STUDENT IN AN ORGANIZED HEALTH CARE EDUCATION/TRAINING PROGRAM

## 2023-12-13 PROCEDURE — 3008F BODY MASS INDEX DOCD: CPT | Mod: CPTII,S$GLB,, | Performed by: STUDENT IN AN ORGANIZED HEALTH CARE EDUCATION/TRAINING PROGRAM

## 2023-12-13 PROCEDURE — 3075F SYST BP GE 130 - 139MM HG: CPT | Mod: CPTII,S$GLB,, | Performed by: STUDENT IN AN ORGANIZED HEALTH CARE EDUCATION/TRAINING PROGRAM

## 2023-12-13 PROCEDURE — 1160F PR REVIEW ALL MEDS BY PRESCRIBER/CLIN PHARMACIST DOCUMENTED: ICD-10-PCS | Mod: CPTII,S$GLB,, | Performed by: STUDENT IN AN ORGANIZED HEALTH CARE EDUCATION/TRAINING PROGRAM

## 2023-12-13 PROCEDURE — 99999 PR PBB SHADOW E&M-EST. PATIENT-LVL V: ICD-10-PCS | Mod: PBBFAC,,, | Performed by: STUDENT IN AN ORGANIZED HEALTH CARE EDUCATION/TRAINING PROGRAM

## 2023-12-13 NOTE — PROGRESS NOTES
Health Maintenance Due   Topic Date Due    Pneumococcal Vaccines (Age 0-64) (1 - PCV) Never done    Eye Exam  Never done    Shingles Vaccine (1 of 2) Never done    COVID-19 Vaccine (3 - 2023-24 season) 09/01/2023    TETANUS VACCINE  10/29/2023        Chart review done.    updated.   Immunizations reviewed & updated.   Care Everywhere updated.

## 2023-12-13 NOTE — PROGRESS NOTES
Subjective:       Patient ID: Lacey Lange is a 53 y.o. female.    Chief Complaint: Dizziness and Extremity Weakness    HPI:  53 y.o. female presents to [facility] for not feeling well.    Patient reports 12/12/2023 had B12 shot. Went to work following and felt lightheaded. Has been missing a lot of work. Patient attributes to diabetes. Patient decided to eat lunch as blood sugar was 78. Tried to get back to work, following lunch still felt lightheaded. Patient reports took 2 Tylenol which did help transiently (about 1 hour). Began to feel bad again and like blood pressure was going up. Checked blood pressure and was 208/123. Messaged her boss and asked for water to be brought to her at that time. Went back to work station and felt like blood pressure again was rising. Wasn't able to recheck at that time as she was working. Patient left work with feelings of illness. Once home, looked into getting help. Avoided ED as she felt nothing would be done emergently, did call and was offered a virtual call.     Patient was informed of anemia with telephone/virtual visit 12/12/2023. Patient was given 3 day work excuse.     Patient today took glimepiride, ate 2 eggs, began having headache. Patient took blood pressure medication after 8 am.    12/12/2023 prior to B12 injection feels that she was having some low blood sugars.     Had blood transfusion 11/24/2023 and reports that blood glucose levels began to decline.    Patient was taken off of metformin because patient reports was feeling like her breathing was poor.    Patient denies family history of thyroid cancer on Multiple Endocrine Neoplasia.    Patient reports chest pain at night before bed.    Frequency?: Some nights, has decreased in frequency since B12  Quality?: Throbbing  Duration?: Brief, fleeting  Interventions?: No known  Provoking factors?: No  Present at rest?: Yes  Nausea?: Yes, worse with B12, but improving  Sweats?: Yes  Jaw/arm  "numbness/tingling?: No  History of CAD?: No    EKG 12/6/2023 NSR HR 89, no concerning ST findings.        Most recent HbA1c: 6.0% 12/11/2023  Home Reads?: 120s  Current Meds: glimepiride 1 mg  On Insulin?: No  Compliance: Hasn't missed until speaking with Nutrition about concerns for low glucose. Poor tolerance of metformin in past with side effect of breathing concerns.    Diet: In evening has been eating more fish and vegetables. No sugar containing drinks  Exercises: Feels not like she should.    Patient has had yeast infections in the past.    Review of Systems   Constitutional:  Positive for chills (Often, at night more so), diaphoresis (Often, at night more so), fatigue and unexpected weight change (weight loss). Negative for fever.   HENT:  Negative for congestion, sinus pressure, sneezing and sore throat.    Respiratory:  Positive for shortness of breath (Intermittently, happened here today). Negative for cough.    Cardiovascular:  Positive for chest pain. Negative for palpitations.   Gastrointestinal:  Positive for constipation (Improving, starting B12 was onset) and nausea (Initially with B12 injections, improving). Negative for abdominal pain, diarrhea and vomiting.   Skin:  Negative for rash and wound.       Objective:      Vitals:    12/13/23 1017   BP: 136/72   BP Location: Left arm   Patient Position: Sitting   BP Method: Medium (Manual)   Pulse: 89   Resp: 18   Temp: 97.7 °F (36.5 °C)   TempSrc: Temporal   SpO2: 99%   Weight: 68.9 kg (151 lb 14.4 oz)   Height: 5' 4" (1.626 m)     Physical Exam  Vitals reviewed.   Constitutional:       General: She is not in acute distress.     Appearance: Normal appearance. She is not ill-appearing.   HENT:      Head: Normocephalic and atraumatic.   Eyes:      General:         Right eye: No discharge.         Left eye: No discharge.      Conjunctiva/sclera: Conjunctivae normal.   Cardiovascular:      Rate and Rhythm: Normal rate and regular rhythm.      Pulses: " Normal pulses.      Heart sounds: No murmur heard.  Pulmonary:      Effort: Pulmonary effort is normal.      Breath sounds: Normal breath sounds.   Musculoskeletal:         General: No deformity.      Cervical back: Neck supple. No rigidity.   Lymphadenopathy:      Cervical: No cervical adenopathy.   Skin:     General: Skin is warm and dry.      Coloration: Skin is not jaundiced.   Neurological:      General: No focal deficit present.      Mental Status: She is alert and oriented to person, place, and time.   Psychiatric:         Mood and Affect: Mood normal.         Behavior: Behavior normal.             Lab Results   Component Value Date     12/06/2023    K 3.4 (L) 12/06/2023     12/06/2023    CO2 21 (L) 12/06/2023    BUN 13 12/06/2023    CREATININE 0.8 12/06/2023    ANIONGAP 12 12/06/2023     Lab Results   Component Value Date    HGBA1C 6.0 (H) 12/11/2023     Lab Results   Component Value Date    BNP <10 12/06/2023    BNP <10 11/28/2023    BNP 17 08/26/2023       Lab Results   Component Value Date    WBC 2.11 (L) 12/09/2023    HGB 8.9 (L) 12/09/2023    HCT 28.2 (L) 12/09/2023    HCT 30 (L) 11/03/2023     (L) 12/09/2023    GRAN 14.0 (L) 12/09/2023    GRAN 1.2 (L) 11/03/2023     Lab Results   Component Value Date    CHOL 165 09/09/2023    HDL 39 (L) 09/09/2023    LDLCALC 115.2 09/09/2023    TRIG 54 09/09/2023          Current Outpatient Medications:     amLODIPine (NORVASC) 5 MG tablet, Take 1 tablet (5 mg total) by mouth once daily., Disp: 90 tablet, Rfl: 0    CMPD hydrocortisone 2%- LIDOcaine 3% suppository (RECTAL ROCKET), Place 1 suppository rectally every evening. Insert 1 suppository 3/4 of the way onto rectum. Wet for easier application. Repeat for 3 nights., Disp: 10 suppository, Rfl: 0    DEXCOM G7 SENSOR Nan, 1 Device by Misc.(Non-Drug; Combo Route) route every 10 days., Disp: 3 each, Rfl: 11    glycerin adult suppository, Place 1 suppository rectally as needed for Constipation., Disp:  25 suppository, Rfl: 0    ondansetron (ZOFRAN-ODT) 4 MG TbDL, Take 1 tablet (4 mg total) by mouth every 6 (six) hours as needed (nausea)., Disp: 20 tablet, Rfl: 1    ONETOUCH DELICA PLUS LANCET 33 gauge Misc, 1 lancet  by Other route 3 (three) times daily., Disp: 100 each, Rfl: 11    ONETOUCH ULTRA TEST Strp, 1 strip by Other route 3 (three) times daily., Disp: 100 each, Rfl: 11    polyethylene glycol (GLYCOLAX) 17 gram PwPk, Take 17 g by mouth once daily., Disp: 14 packet, Rfl: 0    simvastatin (ZOCOR) 10 MG tablet, Take 1 tablet (10 mg total) by mouth every evening., Disp: 90 tablet, Rfl: 3    sodium phosphates (FLEET ENEMA EXTRA) 19-7 gram/197 mL Enem, Place 1 Bottle rectally daily as needed., Disp: 1 enema, Rfl: 0    empagliflozin (JARDIANCE) 10 mg tablet, Take 1 tablet (10 mg total) by mouth once daily., Disp: 30 tablet, Rfl: 0    Current Facility-Administered Medications:     cyanocobalamin injection 1,000 mcg, 1,000 mcg, Intramuscular, Q7 Days, Alfonso Potter MD, 1,000 mcg at 12/12/23 1020        Assessment:       1. Type 2 diabetes mellitus with hyperlipidemia           Plan:       Type 2 diabetes mellitus with hyperlipidemia  -     empagliflozin (JARDIANCE) 10 mg tablet; Take 1 tablet (10 mg total) by mouth once daily.  Dispense: 30 tablet; Refill: 0  - Will discontinue Amaryl at this time with possible hypoglycemic episodes. Will continue to monitor home blood glucose  - Potential side effects including yeast infections and fungal UTI discussed today with plans for medication and instructions to notify clinic. If too recurrent, consider switching to GLP1-RA  - Diet and exercise recommendations provided today's visit    Recommend increased fiber to reduce constipation risk  F/u in 4 weeks off glimepiride and on Jardiance

## 2023-12-13 NOTE — PATIENT INSTRUCTIONS
Patient is informed to follow-up with  primary doctor, excuses for 3 days.  Take a Tylenol for headache, no ibuprofen.

## 2023-12-13 NOTE — PROGRESS NOTES
Subjective:      Patient ID: Lacey Lange is a 53 y.o. female.    Vitals:  vitals were not taken for this visit.     Chief Complaint: No chief complaint on file.      Visit Type: TELE AUDIOVISUAL    Present with the patient at the time of consultation: TELEMED PRESENT WITH PATIENT: None    Past Medical History:   Diagnosis Date    Abnormal cervical Papanicolaou smear 01/01/2014    Colposcopy    Anemia     Gestational diabetes     Hypertension      Past Surgical History:   Procedure Laterality Date    COLPOSCOPY      DILATION AND CURETTAGE OF UTERUS       Review of patient's allergies indicates:  No Known Allergies  Current Outpatient Medications on File Prior to Visit   Medication Sig Dispense Refill    amLODIPine (NORVASC) 5 MG tablet Take 1 tablet (5 mg total) by mouth once daily. 90 tablet 0    CMPD hydrocortisone 2%- LIDOcaine 3% suppository (RECTAL ROCKET) Place 1 suppository rectally every evening. Insert 1 suppository 3/4 of the way onto rectum. Wet for easier application. Repeat for 3 nights. 10 suppository 0    DEXCOM G7 SENSOR Nan 1 Device by Misc.(Non-Drug; Combo Route) route every 10 days. 3 each 11    glimepiride (AMARYL) 1 MG tablet Take 1 mg by mouth before breakfast.      glycerin adult suppository Place 1 suppository rectally as needed for Constipation. 25 suppository 0    ondansetron (ZOFRAN-ODT) 4 MG TbDL Take 1 tablet (4 mg total) by mouth every 6 (six) hours as needed (nausea). 20 tablet 1    ONETOUCH DELICA PLUS LANCET 33 gauge Misc 1 lancet  by Other route 3 (three) times daily. 100 each 11    ONETOUCH ULTRA TEST Strp 1 strip by Other route 3 (three) times daily. 100 each 11    polyethylene glycol (GLYCOLAX) 17 gram PwPk Take 17 g by mouth once daily. 14 packet 0    simvastatin (ZOCOR) 10 MG tablet Take 1 tablet (10 mg total) by mouth every evening. 90 tablet 3    sodium phosphates (FLEET ENEMA EXTRA) 19-7 gram/197 mL Enem Place 1 Bottle rectally daily as needed. 1 enema 0      Current Facility-Administered Medications on File Prior to Visit   Medication Dose Route Frequency Provider Last Rate Last Admin    cyanocobalamin injection 1,000 mcg  1,000 mcg Intramuscular Q7 Days Alfonso Potter MD   1,000 mcg at 12/12/23 1020     Family History   Problem Relation Age of Onset    Breast cancer Cousin     Ovarian cancer Neg Hx     Colon cancer Neg Hx            Ohs Peq Odvv Intake    12/12/2023  5:40 PM CST - Filed by Patient   Describe your reason for todays visit Just always feeling bad but assumed b12 would make it better but pressure went up at work today   What is your current physical address in the event of a medical emergency? [address] [place]  [address]   Are you able to take your vital signs? Yes   Systolic Blood Pressure: 125   Diastolic Blood Pressure: 90   Weight: 150   Height: 64   Pulse: 80   Temperature: 96.2   Respiration rate:    Pulse Oxygen:    Please attach any relevant images or files          Just always feeling bad but assumed b12 would make it better but pressure went up at work today    Patient has a history of for chronic anemia, had a vitamin B12 in doctor's office at 10 AM, 1 PM, blood pressure  up systolic of 200, feeling tdizziness , currently her blood pressure is 125/91, medical record review, seems that she is may have anemia for some times  patient needed excuses as well.           ROS     Objective:   The physical exam was conducted virtually.  Physical Exam   Constitutional: She is oriented to person, place, and time.   HENT:   Head: Normocephalic and atraumatic.   Eyes: Conjunctivae are normal.   Pulmonary/Chest: Effort normal. No respiratory distress.   Abdominal: Normal appearance.   Neurological: no focal deficit. She is alert and oriented to person, place, and time.   Skin: Skin is no rash.   Psychiatric: Mood, judgment and thought content normal.       Assessment:     1. Transient elevated blood pressure        Plan:       Transient  elevated blood pressure        Patient is informed to follow-up with  primary doctor, excuses for 3 days.  Take a Tylenol for headache, no ibuprofen.

## 2023-12-14 ENCOUNTER — TELEPHONE (OUTPATIENT)
Dept: PRIMARY CARE CLINIC | Facility: CLINIC | Age: 53
End: 2023-12-14
Payer: COMMERCIAL

## 2023-12-14 NOTE — TELEPHONE ENCOUNTER
I did address it. I referred her to hematology, and she has an appointment with Dr. Donna Curiel on 1/9.

## 2023-12-14 NOTE — TELEPHONE ENCOUNTER
Called pt regarding message. Pt stated her blood glucose has been dropping despite her eating meals and snacks. BG was 105 before breakfast and 89 after lunch. BG was 93 after taking glucose tablets and 2 turkey sandwiches. Pt also reported feeling cold. Pt requested PCP's recommendation.

## 2023-12-14 NOTE — TELEPHONE ENCOUNTER
Hold Jardiance for now, and make sure she isn't taking any other diabetic meds. Please call with an update next week

## 2023-12-14 NOTE — TELEPHONE ENCOUNTER
----- Message from Katya Thomas sent at 12/14/2023  2:22 PM CST -----  Contact: Patient, [phone]  Calling to speak with the nurse regarding her glucose keeps dropping no matter what she eats. Please advise. Thanks.

## 2023-12-15 ENCOUNTER — OFFICE VISIT (OUTPATIENT)
Dept: INTERNAL MEDICINE | Facility: CLINIC | Age: 53
End: 2023-12-15
Payer: COMMERCIAL

## 2023-12-15 VITALS
OXYGEN SATURATION: 100 % | BODY MASS INDEX: 25.7 KG/M2 | RESPIRATION RATE: 12 BRPM | DIASTOLIC BLOOD PRESSURE: 84 MMHG | HEIGHT: 64 IN | WEIGHT: 150.56 LBS | SYSTOLIC BLOOD PRESSURE: 124 MMHG | HEART RATE: 88 BPM

## 2023-12-15 DIAGNOSIS — D61.818 PANCYTOPENIA: Primary | ICD-10-CM

## 2023-12-15 DIAGNOSIS — F41.9 ANXIETY: ICD-10-CM

## 2023-12-15 PROCEDURE — 3044F PR MOST RECENT HEMOGLOBIN A1C LEVEL <7.0%: ICD-10-PCS | Mod: CPTII,S$GLB,,

## 2023-12-15 PROCEDURE — 3074F PR MOST RECENT SYSTOLIC BLOOD PRESSURE < 130 MM HG: ICD-10-PCS | Mod: CPTII,S$GLB,,

## 2023-12-15 PROCEDURE — 3079F PR MOST RECENT DIASTOLIC BLOOD PRESSURE 80-89 MM HG: ICD-10-PCS | Mod: CPTII,S$GLB,,

## 2023-12-15 PROCEDURE — 3066F NEPHROPATHY DOC TX: CPT | Mod: CPTII,S$GLB,,

## 2023-12-15 PROCEDURE — 3074F SYST BP LT 130 MM HG: CPT | Mod: CPTII,S$GLB,,

## 2023-12-15 PROCEDURE — 1159F MED LIST DOCD IN RCRD: CPT | Mod: CPTII,S$GLB,,

## 2023-12-15 PROCEDURE — 3044F HG A1C LEVEL LT 7.0%: CPT | Mod: CPTII,S$GLB,,

## 2023-12-15 PROCEDURE — 3008F PR BODY MASS INDEX (BMI) DOCUMENTED: ICD-10-PCS | Mod: CPTII,S$GLB,,

## 2023-12-15 PROCEDURE — 99999 PR PBB SHADOW E&M-EST. PATIENT-LVL IV: ICD-10-PCS | Mod: PBBFAC,,,

## 2023-12-15 PROCEDURE — 99999 PR PBB SHADOW E&M-EST. PATIENT-LVL IV: CPT | Mod: PBBFAC,,,

## 2023-12-15 PROCEDURE — 99215 PR OFFICE/OUTPT VISIT, EST, LEVL V, 40-54 MIN: ICD-10-PCS | Mod: S$GLB,,,

## 2023-12-15 PROCEDURE — 1159F PR MEDICATION LIST DOCUMENTED IN MEDICAL RECORD: ICD-10-PCS | Mod: CPTII,S$GLB,,

## 2023-12-15 PROCEDURE — 3061F PR NEG MICROALBUMINURIA RESULT DOCUMENTED/REVIEW: ICD-10-PCS | Mod: CPTII,S$GLB,,

## 2023-12-15 PROCEDURE — 99215 OFFICE O/P EST HI 40 MIN: CPT | Mod: S$GLB,,,

## 2023-12-15 PROCEDURE — 3066F PR DOCUMENTATION OF TREATMENT FOR NEPHROPATHY: ICD-10-PCS | Mod: CPTII,S$GLB,,

## 2023-12-15 PROCEDURE — 3008F BODY MASS INDEX DOCD: CPT | Mod: CPTII,S$GLB,,

## 2023-12-15 PROCEDURE — 3079F DIAST BP 80-89 MM HG: CPT | Mod: CPTII,S$GLB,,

## 2023-12-15 PROCEDURE — 3061F NEG MICROALBUMINURIA REV: CPT | Mod: CPTII,S$GLB,,

## 2023-12-15 NOTE — TELEPHONE ENCOUNTER
Called pt regarding message. Informed pt she has been referred to hematology. Confirmed with pt hold Jardiance for now, and  any other diabetic meds. Please call with an update next week. Pt verbalized understanding.

## 2023-12-15 NOTE — PROGRESS NOTES
Subjective     Patient ID: Lacey Lange is a 53 y.o. female.    Chief Complaint: No chief complaint on file.    Pt seen in clinic today for anemia. She is under the care of Dr Potter and has referrals for colonoscopy and hem/onc. Pt is suffering with pancytopenia and needs work excuse. Pt states she is weak and unable to perform her duties at this time. Pt is also experiencing anxiety related to her health condition.      Review of Systems   Constitutional:  Positive for fatigue.   Respiratory:  Positive for shortness of breath.    Cardiovascular:  Negative for chest pain.   Neurological:  Positive for dizziness and weakness. Negative for syncope and facial asymmetry.   Psychiatric/Behavioral:  The patient is nervous/anxious.           Objective     Physical Exam  Vitals reviewed.   Constitutional:       Appearance: She is well-developed.   HENT:      Head: Normocephalic and atraumatic.   Eyes:      Conjunctiva/sclera: Conjunctivae normal.   Cardiovascular:      Rate and Rhythm: Normal rate.   Pulmonary:      Effort: Pulmonary effort is normal. No respiratory distress.   Skin:     General: Skin is warm and dry.      Findings: No rash.   Neurological:      Mental Status: She is alert and oriented to person, place, and time.      Coordination: Coordination normal.   Psychiatric:         Behavior: Behavior normal.            Assessment and Plan     1. Pancytopenia  Comments:  Pt has referral for colonoscopy and hem/onc. Instructed pt to report to ER for syncope, near syncope, SOB, chest pain, or other concerning symptmos.    2. Anxiety  -     Ambulatory referral/consult to Psychology; Future; Expected date: 12/22/2023                 No follow-ups on file.

## 2023-12-15 NOTE — LETTER
December 15, 2023    Lacey Lange  [address] [facility] [address]             [facility]  [address]  [place] [address]  Phone: [phone]  Fax: [phone] To whom it may concern,    Ms. Lange is under the care of [facility] for a serious medical condition, pancytopenia, that is preventing her from maintaining her work duties at this time. Please allow her to take the time she needs to treat this issue. A return to work date will be provided when one is available. If you have any questions please reach out to me at [phone].    Sincerely,    Glen Vail NP

## 2023-12-18 ENCOUNTER — E-CONSULT (OUTPATIENT)
Dept: HEMATOLOGY/ONCOLOGY | Facility: CLINIC | Age: 53
End: 2023-12-18
Payer: COMMERCIAL

## 2023-12-18 ENCOUNTER — TELEPHONE (OUTPATIENT)
Dept: INTERNAL MEDICINE | Facility: CLINIC | Age: 53
End: 2023-12-18
Payer: COMMERCIAL

## 2023-12-18 ENCOUNTER — TELEPHONE (OUTPATIENT)
Dept: PRIMARY CARE CLINIC | Facility: CLINIC | Age: 53
End: 2023-12-18
Payer: COMMERCIAL

## 2023-12-18 DIAGNOSIS — D61.818 PANCYTOPENIA: Primary | ICD-10-CM

## 2023-12-18 PROCEDURE — 99499 NO LOS: ICD-10-PCS | Mod: S$GLB,,, | Performed by: INTERNAL MEDICINE

## 2023-12-18 PROCEDURE — 99499 UNLISTED E&M SERVICE: CPT | Mod: S$GLB,,, | Performed by: INTERNAL MEDICINE

## 2023-12-18 NOTE — TELEPHONE ENCOUNTER
Called pt regarding message. Appt has been rescheduled for earlier time slot, pt verbalized understanding.

## 2023-12-18 NOTE — CONSULTS
[facility] - Hematology Oncology  Response for E-Consult     Patient Name: Lacey Lange  MRN: [id number]  Primary Care Provider: Alfonso Potter MD   Requesting Provider: Glen Vail, *  E-Consult to Hemonc  Consult performed by: Kalpana Stovall MD  Consult ordered by: Glen Vail NP  Reason for consult: Pancytopenia  Assessment/Recommendations: See Consult           Unfortunately, this eConsult has been declined due to: Other    Established Patient:  This eConsult submission cannot be completed at this time. This is an established patient in our specialty and we are currently managing the problem for which you have sent a question about. The eConsult tool is not meant to be used for problems which are currently being managed by a specialist. In order to further assist you, please contact our specialty through the usual channels.    Other:  This eConsult submission cannot be completed at this time due to New patient visit scheduled with Dr. Curiel early Jan..      Thank you for this eConsult referral.     Kalpana Stovall MD  [facility] - Hematology Oncology

## 2023-12-18 NOTE — TELEPHONE ENCOUNTER
----- Message from Raysa Meek sent at 12/18/2023  2:42 PM CST -----  Contact: [phone] Patient  Pt is calling in regards to her nurse visit tomorrow 12/19/2023. Pt is asking if she can come earlier tomorrow in the morning. Please call and advise. Thank you.

## 2023-12-19 ENCOUNTER — TELEPHONE (OUTPATIENT)
Dept: INTERNAL MEDICINE | Facility: CLINIC | Age: 53
End: 2023-12-19
Payer: COMMERCIAL

## 2023-12-19 ENCOUNTER — CLINICAL SUPPORT (OUTPATIENT)
Dept: PRIMARY CARE CLINIC | Facility: CLINIC | Age: 53
End: 2023-12-19
Payer: COMMERCIAL

## 2023-12-19 DIAGNOSIS — E53.8 B12 DEFICIENCY: Primary | ICD-10-CM

## 2023-12-19 PROCEDURE — 96372 PR INJECTION,THERAP/PROPH/DIAG2ST, IM OR SUBCUT: ICD-10-PCS | Mod: S$GLB,,, | Performed by: FAMILY MEDICINE

## 2023-12-19 PROCEDURE — 96372 THER/PROPH/DIAG INJ SC/IM: CPT | Mod: S$GLB,,, | Performed by: FAMILY MEDICINE

## 2023-12-19 RX ADMIN — CYANOCOBALAMIN 1000 MCG: 1000 INJECTION, SOLUTION INTRAMUSCULAR; SUBCUTANEOUS at 09:12

## 2023-12-19 NOTE — PROGRESS NOTES
Verified pt ID using name and . NDKA. Administered b12 1000mcg in right dorsagluteal per physician order using aseptic technique. Aspirated and no blood return noted. Pt tolerated well with no adverse reactions noted.

## 2023-12-19 NOTE — TELEPHONE ENCOUNTER
Contacted pt regarding e-consult. Hem/onc gave no further guidance and stated that pt is to maintain her 1/2/24 appt. Pt verbalized understanding.

## 2023-12-20 ENCOUNTER — NURSE TRIAGE (OUTPATIENT)
Dept: ADMINISTRATIVE | Facility: CLINIC | Age: 53
End: 2023-12-20
Payer: COMMERCIAL

## 2023-12-20 ENCOUNTER — PATIENT MESSAGE (OUTPATIENT)
Dept: URGENT CARE | Facility: CLINIC | Age: 53
End: 2023-12-20
Payer: COMMERCIAL

## 2023-12-20 NOTE — TELEPHONE ENCOUNTER
I would recommend that she consult with Hematology as scheduled on 1/2. She can hold off on B12 shots for now until after she sees Hematology

## 2023-12-20 NOTE — TELEPHONE ENCOUNTER
Called pt regarding message. Pt stated she did not go to .Pt stated she had B12 shot on 12/19. Pt stated after getting the B12 shot she feels worse today.Pt reported bilateral leg weakness. Pt reported mild blurred vision when wearing her glasses.Pt's BP on 12/20 is 137/91 pulse 88. Pt's blood sugar on 12/20 is 139.

## 2023-12-20 NOTE — TELEPHONE ENCOUNTER
Called pt regarding message. Informed pt recommend that she consult with Hematology as scheduled on 1/2. She can hold off on B12 shots for now until after she sees Hematology. Pt verbalized understanding.

## 2023-12-20 NOTE — TELEPHONE ENCOUNTER
Pt reporting she has hx of anemia, got b12 shot yesterday. Pt experiencing weakness in legs, mild blurred vision corrected when she puts her glasses on. Felt foggy yesterday but not as much today. Just doesn't feel well all the time, weakness is primary symptom. It has been an ongoing issue. BP currently 146/99/HR 85, blood sugar currently 132. Pt states her primary concern at this time is weakness.    Dispo- go to office now. Unable to scheduled appt within time frame. Advised pt on urgent care, on demand virtual visit. Pt states she is starting to feel better after drinking water during call and would like to hold off on being seen for now. Emphasized importance of being evaluated now for current symptoms. Pt verbalizes understanding, states if she starts feeling worse she might do virtual visit. Pt urged to call back with any new or worsening symptoms. Message routed to providers office.  Reason for Disposition   MODERATE weakness (i.e., interferes with work, school, normal activities) and cause unknown (Exceptions: Weakness from acute minor illness or from poor fluid intake; weakness is chronic and not worse.)    Additional Information   Negative: SEVERE difficulty breathing (e.g., struggling for each breath, speaks in single words)   Negative: Shock suspected (e.g., cold/pale/clammy skin, too weak to stand, low BP, rapid pulse)   Negative: Difficult to awaken or acting confused (e.g., disoriented, slurred speech)   Negative: Fainted > 15 minutes ago and still feels too weak or dizzy to stand   Negative: SEVERE weakness (i.e., unable to walk or barely able to walk, requires support) and new-onset or worsening   Negative: Sounds like a life-threatening emergency to the triager   Negative: Difficulty breathing   Negative: Heart beating < 50 beats per minute OR > 140 beats per minute   Negative: Extra heartbeats, irregular heart beating, or heart is beating very fast (i.e., 'palpitations')   Negative: Follows large  amount of bleeding (e.g., from vomiting, rectum, vagina) (Exception: Small transient weakness from sight of a small amount blood.)   Negative: Black or tarry bowel movements   Negative: MODERATE weakness or fatigue from poor fluid intake with no improvement after 2 hours of rest and fluids   Negative: Drinking very little and dehydration suspected (e.g., no urine > 12 hours, very dry mouth, very lightheaded)   Negative: Patient sounds very sick or weak to the triager    Protocols used: Weakness (Generalized) and Fatigue-A-OH

## 2023-12-27 ENCOUNTER — PATIENT MESSAGE (OUTPATIENT)
Dept: PRIMARY CARE CLINIC | Facility: CLINIC | Age: 53
End: 2023-12-27

## 2023-12-27 ENCOUNTER — PATIENT MESSAGE (OUTPATIENT)
Dept: PRIMARY CARE CLINIC | Facility: CLINIC | Age: 53
End: 2023-12-27
Payer: COMMERCIAL

## 2023-12-27 ENCOUNTER — OFFICE VISIT (OUTPATIENT)
Dept: PRIMARY CARE CLINIC | Facility: CLINIC | Age: 53
End: 2023-12-27
Payer: COMMERCIAL

## 2023-12-27 VITALS — SYSTOLIC BLOOD PRESSURE: 115 MMHG | DIASTOLIC BLOOD PRESSURE: 60 MMHG

## 2023-12-27 DIAGNOSIS — D61.818 PANCYTOPENIA: ICD-10-CM

## 2023-12-27 DIAGNOSIS — I10 PRIMARY HYPERTENSION: Primary | Chronic | ICD-10-CM

## 2023-12-27 DIAGNOSIS — D69.6 THROMBOCYTOPENIA, UNSPECIFIED: ICD-10-CM

## 2023-12-27 DIAGNOSIS — E53.8 B12 DEFICIENCY: ICD-10-CM

## 2023-12-27 PROCEDURE — 3066F NEPHROPATHY DOC TX: CPT | Mod: CPTII,95,, | Performed by: FAMILY MEDICINE

## 2023-12-27 PROCEDURE — 3078F PR MOST RECENT DIASTOLIC BLOOD PRESSURE < 80 MM HG: ICD-10-PCS | Mod: CPTII,95,, | Performed by: FAMILY MEDICINE

## 2023-12-27 PROCEDURE — 3074F SYST BP LT 130 MM HG: CPT | Mod: CPTII,95,, | Performed by: FAMILY MEDICINE

## 2023-12-27 PROCEDURE — 3061F PR NEG MICROALBUMINURIA RESULT DOCUMENTED/REVIEW: ICD-10-PCS | Mod: CPTII,95,, | Performed by: FAMILY MEDICINE

## 2023-12-27 PROCEDURE — 3074F PR MOST RECENT SYSTOLIC BLOOD PRESSURE < 130 MM HG: ICD-10-PCS | Mod: CPTII,95,, | Performed by: FAMILY MEDICINE

## 2023-12-27 PROCEDURE — 3044F PR MOST RECENT HEMOGLOBIN A1C LEVEL <7.0%: ICD-10-PCS | Mod: CPTII,95,, | Performed by: FAMILY MEDICINE

## 2023-12-27 PROCEDURE — 3078F DIAST BP <80 MM HG: CPT | Mod: CPTII,95,, | Performed by: FAMILY MEDICINE

## 2023-12-27 PROCEDURE — 3044F HG A1C LEVEL LT 7.0%: CPT | Mod: CPTII,95,, | Performed by: FAMILY MEDICINE

## 2023-12-27 PROCEDURE — 3061F NEG MICROALBUMINURIA REV: CPT | Mod: CPTII,95,, | Performed by: FAMILY MEDICINE

## 2023-12-27 PROCEDURE — 3066F PR DOCUMENTATION OF TREATMENT FOR NEPHROPATHY: ICD-10-PCS | Mod: CPTII,95,, | Performed by: FAMILY MEDICINE

## 2023-12-27 PROCEDURE — 99213 PR OFFICE/OUTPT VISIT, EST, LEVL III, 20-29 MIN: ICD-10-PCS | Mod: 95,,, | Performed by: FAMILY MEDICINE

## 2023-12-27 PROCEDURE — 99213 OFFICE O/P EST LOW 20 MIN: CPT | Mod: 95,,, | Performed by: FAMILY MEDICINE

## 2023-12-27 NOTE — PROGRESS NOTES
Subjective:       Patient ID: Lacey Lange is a 53 y.o. female.    Chief Complaint: No chief complaint on file.      Has been taking blood pressure medication consistently, says she still gets a little lightheaded and dizzy at times, but blood pressure has been much better overall.  Still having some elevated readings at times.  No chest pain.  Has not been able to go back to work, works as a  at a bank.  Needs [place] paperwork completed to return to work, but does not have a clean copy at this time.  Also scheduled for consultation with Hematology next week for pancytopenia.  Says she felt bad after receiving her most recent B12 injection, so has not gotten anymore yet      The patient location is: home  The chief complaint leading to consultation is: HTN, return to work    Visit type: audiovisual    Face to Face time with patient: 8 min  12 minutes of total time spent on the encounter, which includes face to face time and non-face to face time preparing to see the patient (eg, review of tests), Obtaining and/or reviewing separately obtained history, Documenting clinical information in the electronic or other health record, Independently interpreting results (not separately reported) and communicating results to the patient/family/caregiver, or Care coordination (not separately reported).         Each patient to whom he or she provides medical services by telemedicine is:  (1) informed of the relationship between the physician and patient and the respective role of any other health care provider with respect to management of the patient; and (2) notified that he or she may decline to receive medical services by telemedicine and may withdraw from such care at any time.    Notes:    Review of Systems   Constitutional:  Positive for fatigue. Negative for fever.   Respiratory:  Negative for shortness of breath.    Cardiovascular:  Negative for chest pain.   Neurological:  Positive for  light-headedness. Negative for syncope.       Objective:      Vitals:    12/27/23 1418   BP: 115/60     Physical Exam  Constitutional:       General: She is not in acute distress.     Appearance: Normal appearance. She is well-developed.   Pulmonary:      Effort: No respiratory distress.   Neurological:      Mental Status: She is alert and oriented to person, place, and time.   Psychiatric:         Behavior: Behavior normal.         Lab Results   Component Value Date    WBC 2.11 (L) 12/09/2023    HGB 8.9 (L) 12/09/2023    HCT 28.2 (L) 12/09/2023     (L) 12/09/2023    CHOL 165 09/09/2023    TRIG 54 09/09/2023    HDL 39 (L) 09/09/2023    ALT 18 12/06/2023    AST 36 12/06/2023     12/06/2023    K 3.4 (L) 12/06/2023     12/06/2023    CREATININE 0.8 12/06/2023    BUN 13 12/06/2023    CO2 21 (L) 12/06/2023    TSH 1.229 12/06/2023    HGBA1C 6.0 (H) 12/11/2023      Assessment:       1. Primary hypertension    2. Pancytopenia    3. B12 deficiency    4. Thrombocytopenia, unspecified        Plan:       Primary hypertension  Continue current meds for now  Pancytopenia  Consultation with Hematology next week  B12 deficiency    Thrombocytopenia, unspecified      Medication List with Changes/Refills   Current Medications    AMLODIPINE (NORVASC) 5 MG TABLET    Take 1 tablet (5 mg total) by mouth once daily.    CMPD HYDROCORTISONE 2%- LIDOCAINE 3% SUPPOSITORY (RECTAL ROCKET)    Place 1 suppository rectally every evening. Insert 1 suppository 3/4 of the way onto rectum. Wet for easier application. Repeat for 3 nights.    DEXCOM G7 SENSOR SUSIE    1 Device by Misc.(Non-Drug; Combo Route) route every 10 days.    EMPAGLIFLOZIN (JARDIANCE) 10 MG TABLET    Take 1 tablet (10 mg total) by mouth once daily.    GLYCERIN ADULT SUPPOSITORY    Place 1 suppository rectally as needed for Constipation.    ONDANSETRON (ZOFRAN-ODT) 4 MG TBDL    Take 1 tablet (4 mg total) by mouth every 6 (six) hours as needed (nausea).    ONETOUCH  DELICA PLUS LANCET 33 GAUGE MISC    1 lancet  by Other route 3 (three) times daily.    ONETOUCH ULTRA TEST STRP    1 strip by Other route 3 (three) times daily.    POLYETHYLENE GLYCOL (GLYCOLAX) 17 GRAM PWPK    Take 17 g by mouth once daily.    SIMVASTATIN (ZOCOR) 10 MG TABLET    Take 1 tablet (10 mg total) by mouth every evening.

## 2023-12-28 ENCOUNTER — TELEPHONE (OUTPATIENT)
Dept: DIABETES | Facility: CLINIC | Age: 53
End: 2023-12-28
Payer: COMMERCIAL

## 2023-12-28 NOTE — TELEPHONE ENCOUNTER
No care due was identified.  [facility] Embedded Care Due Messages. Reference number: [id number].   12/28/2023 3:10:08 PM CST

## 2023-12-28 NOTE — TELEPHONE ENCOUNTER
----- Message from Stormy Rhoades sent at 12/28/2023  2:55 PM CST -----  Contact: [phone]@[email or web address]  Good afternoon patient would like a call back to discuss seeing if the doc could her get a new DEXCOM G7 SENSOR Nan patient says she has lost two in one week patient says something may have been wrong with them. Please give patient a call back [phone]

## 2023-12-28 NOTE — TELEPHONE ENCOUNTER
----- Message from Beatriz Sánchez sent at 12/28/2023  9:23 AM CST -----  Contact: Self/ [phone]  [email or web address]     Patient is calling for Medical Advice regarding:pt having problems with her DEXCOM G7 SENSOR Nan working       Would like response via [place]:  Would like a return call     Comments:pt stated that she is out of town and her Dexcom G7 is not working patient stated that she kept getting an error and then the device fell of her arm , she stated that she taped it but it is not working and she does not have anymore with her. Please advise

## 2023-12-29 DIAGNOSIS — E11.65 TYPE 2 DIABETES MELLITUS WITH HYPERGLYCEMIA, WITHOUT LONG-TERM CURRENT USE OF INSULIN: ICD-10-CM

## 2023-12-29 RX ORDER — BLOOD-GLUCOSE SENSOR
1 EACH MISCELLANEOUS
Qty: 9 EACH | Refills: 0 | Status: SHIPPED | OUTPATIENT
Start: 2023-12-29 | End: 2023-12-29 | Stop reason: SDUPTHER

## 2023-12-29 RX ORDER — BLOOD-GLUCOSE SENSOR
EACH MISCELLANEOUS
Qty: 9 EACH | Refills: 3 | Status: SHIPPED | OUTPATIENT
Start: 2023-12-29 | End: 2024-04-01

## 2023-12-29 NOTE — TELEPHONE ENCOUNTER
Refill Decision Note   Lacey Lange  is requesting a refill authorization.  Brief Assessment and Rationale for Refill:  Approve     Medication Therapy Plan:         Comments:     Note composed:2:09 PM 12/29/2023

## 2023-12-29 NOTE — TELEPHONE ENCOUNTER
No care due was identified.  [facility] Embedded Care Due Messages. Reference number: [id number].   12/29/2023 8:15:36 AM CST

## 2024-01-02 ENCOUNTER — OFFICE VISIT (OUTPATIENT)
Dept: HEMATOLOGY/ONCOLOGY | Facility: CLINIC | Age: 54
End: 2024-01-02
Payer: COMMERCIAL

## 2024-01-02 DIAGNOSIS — D61.818 PANCYTOPENIA: ICD-10-CM

## 2024-01-02 DIAGNOSIS — E53.8 B12 DEFICIENCY: ICD-10-CM

## 2024-01-02 PROCEDURE — 99215 OFFICE O/P EST HI 40 MIN: CPT | Mod: 95,,, | Performed by: INTERNAL MEDICINE

## 2024-01-02 PROCEDURE — 1159F MED LIST DOCD IN RCRD: CPT | Mod: CPTII,95,, | Performed by: INTERNAL MEDICINE

## 2024-01-02 PROCEDURE — 1160F RVW MEDS BY RX/DR IN RCRD: CPT | Mod: CPTII,95,, | Performed by: INTERNAL MEDICINE

## 2024-01-02 NOTE — PROGRESS NOTES
The patient location is: home  The chief complaint leading to consultation is: abnormal labs    Visit type: audiovisual    Face to Face time with patient: 20  40 minutes of total time spent on the encounter, which includes face to face time and non-face to face time preparing to see the patient (eg, review of tests), Obtaining and/or reviewing separately obtained history, Documenting clinical information in the electronic or other health record, Independently interpreting results (not separately reported) and communicating results to the patient/family/caregiver, or Care coordination (not separately reported).         Each patient to whom he or she provides medical services by telemedicine is:  (1) informed of the relationship between the physician and patient and the respective role of any other health care provider with respect to management of the patient; and (2) notified that he or she may decline to receive medical services by telemedicine and may withdraw from such care at any time.    Notes:      Subjective:       Patient ID: Lacey Lange is a 53 y.o. female.    Chief Complaint: Abnormal Lab    HPI    New virtual visit for abnormal CBC and B12 deficiency.     Chronic anemia, patient reports since childhood including prior iron deficiency.    Low B12 level recognized several months ago with low levels and negative intrinsic factor antibodies in August 2023.      Has taken 3 weekly injection recently and now stopped. Has OTC b12 replacement. Has not started yet.    In addition WBC and platelet count trended down starting 11/3/2023 after starting evaluation for hyperglycemia. Emergency records indicate diagnosis of acute pyelonephritis at that time and initiation of antibiotic therapy with cefpodoxime for 10 day course. Was in ER again 11/26 for weakness, just completed her course of antibiotics and was given 10 day course of ciprofloxacin completed early December.     During illness and antibiotic  therapy patients WBC and platelet count trended down. Now in recovery since completing antibiotics.     Remains anemic with hemoglobin 8-9 grams and normal MCV.    Patient feels unwell. Reports feeling unwell since diagnosis of HTN and Diabetes this summer. She questions the accuracy and treatment of these diagnoses. Reports weakness and inability to complete daily activities. She is currently not working due to the constitutional symptoms marked in her ROS in virtual visit pre-check.    Review of Systems   Constitutional:  Negative for appetite change and unexpected weight change.   HENT:  Positive for mouth sores.    Eyes:  Positive for visual disturbance.   Respiratory:  Positive for cough and shortness of breath.    Cardiovascular:  Positive for chest pain.   Gastrointestinal:  Positive for abdominal pain. Negative for diarrhea.   Genitourinary:  Positive for frequency.   Musculoskeletal:  Positive for back pain.   Integumentary:  Positive for rash.   Neurological:  Positive for headaches.   Hematological:  Positive for adenopathy.   Psychiatric/Behavioral:  The patient is nervous/anxious.          Objective:      Physical Exam    Current Outpatient Medications   Medication Sig Dispense Refill    amLODIPine (NORVASC) 5 MG tablet Take 1 tablet (5 mg total) by mouth once daily. 90 tablet 0    CMPD hydrocortisone 2%- LIDOcaine 3% suppository (RECTAL ROCKET) Place 1 suppository rectally every evening. Insert 1 suppository 3/4 of the way onto rectum. Wet for easier application. Repeat for 3 nights. 10 suppository 0    DEXCOM G7 SENSOR Nan Use for continuous glucose monitoring; replace sensor every 10 days 9 each 3    empagliflozin (JARDIANCE) 10 mg tablet Take 1 tablet (10 mg total) by mouth once daily. 30 tablet 0    glycerin adult suppository Place 1 suppository rectally as needed for Constipation. 25 suppository 0    ondansetron (ZOFRAN-ODT) 4 MG TbDL Take 1 tablet (4 mg total) by mouth every 6 (six) hours  as needed (nausea). 20 tablet 1    ONETOUCH DELICA PLUS LANCET 33 gauge Misc 1 lancet  by Other route 3 (three) times daily. 100 each 11    ONETOUCH ULTRA TEST Strp 1 strip by Other route 3 (three) times daily. 100 each 11    polyethylene glycol (GLYCOLAX) 17 gram PwPk Take 17 g by mouth once daily. 14 packet 0    simvastatin (ZOCOR) 10 MG tablet Take 1 tablet (10 mg total) by mouth every evening. 90 tablet 3     No current facility-administered medications for this visit.      Lab Results   Component Value Date    WBC 2.11 (L) 12/09/2023    HGB 8.9 (L) 12/09/2023    HCT 28.2 (L) 12/09/2023    MCV 93 12/09/2023     (L) 12/09/2023        CMP  Sodium   Date Value Ref Range Status   12/06/2023 138 136 - 145 mmol/L Final     Potassium   Date Value Ref Range Status   12/06/2023 3.4 (L) 3.5 - 5.1 mmol/L Final     Chloride   Date Value Ref Range Status   12/06/2023 105 95 - 110 mmol/L Final     CO2   Date Value Ref Range Status   12/06/2023 21 (L) 23 - 29 mmol/L Final     Glucose   Date Value Ref Range Status   12/06/2023 137 (H) 70 - 110 mg/dL Final     BUN   Date Value Ref Range Status   12/06/2023 13 6 - 20 mg/dL Final     Creatinine   Date Value Ref Range Status   12/06/2023 0.8 0.5 - 1.4 mg/dL Final     Calcium   Date Value Ref Range Status   12/06/2023 9.8 8.7 - 10.5 mg/dL Final     Total Protein   Date Value Ref Range Status   12/06/2023 8.6 (H) 6.0 - 8.4 g/dL Final     Albumin   Date Value Ref Range Status   12/06/2023 4.3 3.5 - 5.2 g/dL Final     Total Bilirubin   Date Value Ref Range Status   12/06/2023 1.0 0.1 - 1.0 mg/dL Final     Comment:     For infants and newborns, interpretation of results should be based  on gestational age, weight and in agreement with clinical  observations.    Premature Infant recommended reference ranges:  Up to 24 hours.............<8.0 mg/dL  Up to 48 hours............<12.0 mg/dL  3-5 days..................<15.0 mg/dL  6-29 days.................<15.0 mg/dL       Alkaline  Phosphatase   Date Value Ref Range Status   12/06/2023 66 55 - 135 U/L Final     AST   Date Value Ref Range Status   12/06/2023 36 10 - 40 U/L Final     ALT   Date Value Ref Range Status   12/06/2023 18 10 - 44 U/L Final     Anion Gap   Date Value Ref Range Status   12/06/2023 12 8 - 16 mmol/L Final     eGFR if    Date Value Ref Range Status   07/11/2013 >60 >60 mL/min Final     Comment:     Estimated glomerular filtration rate (eGFR) is normalized to an  average body surface area of 1.73 square meters.  The calculation  used to obtain the eGFR is the adjusted MDRD equation, which factors  patient sex, age, race, and creatinine result.  Since race is unknown  in our information system, the eGFR values for -American  and Non--American patients are given for each creatinine  result.     eGFR if non    Date Value Ref Range Status   07/11/2013 >60 >60 mL/min Final          Assessment:       Problem List Items Addressed This Visit          Oncology    Pancytopenia       Endocrine    B12 deficiency       Plan:       Discussed need to update labs, ferritin, and B12 to understand next steps in treatment of her anemia.   Suspect temporary leukopenia and thrombocytopenia was due to infection and antibiotics therapy and this is in recovery since completing antibiotic treatments.  OK to take OTC b12 supplement.    Labs in [place] [facility]        BMT Chart Routing  Urgent    Follow up with physician    Follow up with [facility]    Provider visit type    Infusion scheduling note    Injection scheduling note    Labs CBC, ferritin and vitamin B12   Scheduling:  Preferred lab:  Lab interval:  please schedule lab in [place], patient will try to go today 1/2/2024   Imaging    Pharmacy appointment    Other referrals

## 2024-01-03 ENCOUNTER — TELEPHONE (OUTPATIENT)
Dept: PRIMARY CARE CLINIC | Facility: CLINIC | Age: 54
End: 2024-01-03
Payer: COMMERCIAL

## 2024-01-03 NOTE — TELEPHONE ENCOUNTER
----- Message from Ness Amor sent at 1/3/2024  8:32 AM CST -----  Regarding: leave of care form  Pt need form fill out for administration leave.

## 2024-01-03 NOTE — TELEPHONE ENCOUNTER
Called pt regarding message. Pt wanted to inform staff that she dropped off FMLA form. Form has been  received. Pt verbalized understanding.

## 2024-01-03 NOTE — TELEPHONE ENCOUNTER
----- Message from Stormy Rhoades sent at 1/3/2024  8:43 AM CST -----  Contact: [phone]@[email or web address]  Patient is returning a phone call.yes   Who left a message for the patient: Halie Weller MA  Does patient know what this is regarding:  yes   Would you like a call back, or a response through your [facility] portal?:   call back   Comments:

## 2024-01-05 ENCOUNTER — OFFICE VISIT (OUTPATIENT)
Dept: PRIMARY CARE CLINIC | Facility: CLINIC | Age: 54
End: 2024-01-05
Payer: COMMERCIAL

## 2024-01-05 VITALS
OXYGEN SATURATION: 99 % | HEART RATE: 76 BPM | DIASTOLIC BLOOD PRESSURE: 64 MMHG | SYSTOLIC BLOOD PRESSURE: 128 MMHG | TEMPERATURE: 97 F | HEIGHT: 64 IN | RESPIRATION RATE: 18 BRPM | BODY MASS INDEX: 25.2 KG/M2 | WEIGHT: 147.63 LBS

## 2024-01-05 DIAGNOSIS — E78.5 TYPE 2 DIABETES MELLITUS WITH HYPERLIPIDEMIA: ICD-10-CM

## 2024-01-05 DIAGNOSIS — E53.8 B12 DEFICIENCY: ICD-10-CM

## 2024-01-05 DIAGNOSIS — E11.69 TYPE 2 DIABETES MELLITUS WITH HYPERLIPIDEMIA: ICD-10-CM

## 2024-01-05 DIAGNOSIS — D61.818 PANCYTOPENIA: Primary | ICD-10-CM

## 2024-01-05 PROBLEM — D69.6 THROMBOCYTOPENIA, UNSPECIFIED: Status: RESOLVED | Noted: 2023-12-27 | Resolved: 2024-01-05

## 2024-01-05 PROCEDURE — 1160F RVW MEDS BY RX/DR IN RCRD: CPT | Mod: CPTII,S$GLB,, | Performed by: FAMILY MEDICINE

## 2024-01-05 PROCEDURE — 1159F MED LIST DOCD IN RCRD: CPT | Mod: CPTII,S$GLB,, | Performed by: FAMILY MEDICINE

## 2024-01-05 PROCEDURE — 3008F BODY MASS INDEX DOCD: CPT | Mod: CPTII,S$GLB,, | Performed by: FAMILY MEDICINE

## 2024-01-05 PROCEDURE — 99213 OFFICE O/P EST LOW 20 MIN: CPT | Mod: S$GLB,,, | Performed by: FAMILY MEDICINE

## 2024-01-05 PROCEDURE — 3078F DIAST BP <80 MM HG: CPT | Mod: CPTII,S$GLB,, | Performed by: FAMILY MEDICINE

## 2024-01-05 PROCEDURE — 99999 PR PBB SHADOW E&M-EST. PATIENT-LVL IV: CPT | Mod: PBBFAC,,, | Performed by: FAMILY MEDICINE

## 2024-01-05 PROCEDURE — 3074F SYST BP LT 130 MM HG: CPT | Mod: CPTII,S$GLB,, | Performed by: FAMILY MEDICINE

## 2024-01-05 NOTE — PROGRESS NOTES
"Subjective:       Patient ID: Lacey Lange is a 53 y.o. female.    Chief Complaint: Follow-up ([place] form)    Has been out of work since mid December, needs [facility] paperwork completed for short-term leave.  Feeling a little better overall, but still very fatigued.  Has been evaluated by Hematology, pancytopenia improving, white blood cell count and platelets have normalized.  Anemia getting better slowly.  Did not tolerate subcu vitamin B12    Follow-up  Associated symptoms include fatigue. Pertinent negatives include no chest pain.     Review of Systems   Constitutional:  Positive for fatigue.   Cardiovascular:  Negative for chest pain.   Neurological:  Positive for dizziness.   Psychiatric/Behavioral:  Negative for agitation and confusion.        Objective:      Vitals:    01/05/24 1258   BP: 128/64   BP Location: Right arm   Patient Position: Sitting   BP Method: Medium (Manual)   Pulse: 76   Resp: 18   Temp: 97.3 °F (36.3 °C)   TempSrc: Temporal   SpO2: 99%   Weight: 67 kg (147 lb 9.6 oz)   Height: 5' 4" (1.626 m)     BP Readings from Last 5 Encounters:   01/05/24 128/64   12/27/23 115/60   12/15/23 124/84   12/13/23 136/72   12/11/23 116/70     Wt Readings from Last 5 Encounters:   01/05/24 67 kg (147 lb 9.6 oz)   12/15/23 68.3 kg (150 lb 9.2 oz)   12/13/23 68.9 kg (151 lb 14.4 oz)   12/11/23 68.1 kg (150 lb 2.1 oz)   12/07/23 70.3 kg (155 lb)     Physical Exam  Vitals and nursing note reviewed.   Constitutional:       General: She is not in acute distress.     Appearance: Normal appearance. She is well-developed.   HENT:      Head: Normocephalic and atraumatic.   Cardiovascular:      Rate and Rhythm: Normal rate and regular rhythm.      Heart sounds: Normal heart sounds.   Pulmonary:      Effort: Pulmonary effort is normal.      Breath sounds: Normal breath sounds.   Musculoskeletal:      Right lower leg: No edema.      Left lower leg: No edema.   Skin:     General: Skin is warm and dry.   Neurological: "      Mental Status: She is alert and oriented to person, place, and time.   Psychiatric:         Mood and Affect: Mood normal.         Behavior: Behavior normal.         Lab Results   Component Value Date    WBC 7.11 01/03/2024    HGB 10.7 (L) 01/03/2024    HCT 34.5 (L) 01/03/2024     01/03/2024    CHOL 165 09/09/2023    TRIG 54 09/09/2023    HDL 39 (L) 09/09/2023    ALT 18 12/06/2023    AST 36 12/06/2023     12/06/2023    K 3.4 (L) 12/06/2023     12/06/2023    CREATININE 0.8 12/06/2023    BUN 13 12/06/2023    CO2 21 (L) 12/06/2023    TSH 1.229 12/06/2023    HGBA1C 6.0 (H) 12/11/2023      Assessment:       1. Pancytopenia    2. Type 2 diabetes mellitus with hyperlipidemia    3. B12 deficiency        Plan:       Pancytopenia  Improving.  [place] paperwork completed, anticipate return to work 02/15/2024  Type 2 diabetes mellitus with hyperlipidemia  Continue current regimen  B12 deficiency  Advised oral supplementation    Medication List with Changes/Refills   Current Medications    AMLODIPINE (NORVASC) 5 MG TABLET    Take 1 tablet (5 mg total) by mouth once daily.    DEXCOM G7 SENSOR SUSIE    Use for continuous glucose monitoring; replace sensor every 10 days    GLYCERIN ADULT SUPPOSITORY    Place 1 suppository rectally as needed for Constipation.    ONETOUCH DELICA PLUS LANCET 33 GAUGE MISC    1 lancet  by Other route 3 (three) times daily.    ONETOUCH ULTRA TEST STRP    1 strip by Other route 3 (three) times daily.    POLYETHYLENE GLYCOL (GLYCOLAX) 17 GRAM PWPK    Take 17 g by mouth once daily.   Discontinued Medications    CMPD HYDROCORTISONE 2%- LIDOCAINE 3% SUPPOSITORY (RECTAL ROCKET)    Place 1 suppository rectally every evening. Insert 1 suppository 3/4 of the way onto rectum. Wet for easier application. Repeat for 3 nights.    EMPAGLIFLOZIN (JARDIANCE) 10 MG TABLET    Take 1 tablet (10 mg total) by mouth once daily.    ONDANSETRON (ZOFRAN-ODT) 4 MG TBDL    Take 1 tablet (4 mg total) by mouth  every 6 (six) hours as needed (nausea).    SIMVASTATIN (ZOCOR) 10 MG TABLET    Take 1 tablet (10 mg total) by mouth every evening.

## 2024-01-11 ENCOUNTER — OFFICE VISIT (OUTPATIENT)
Dept: OBSTETRICS AND GYNECOLOGY | Facility: CLINIC | Age: 54
End: 2024-01-11
Payer: COMMERCIAL

## 2024-01-11 VITALS
WEIGHT: 147.06 LBS | HEIGHT: 64 IN | BODY MASS INDEX: 25.11 KG/M2 | SYSTOLIC BLOOD PRESSURE: 100 MMHG | DIASTOLIC BLOOD PRESSURE: 66 MMHG

## 2024-01-11 DIAGNOSIS — Z76.89 ENCOUNTER TO ESTABLISH CARE WITH NEW DOCTOR: ICD-10-CM

## 2024-01-11 DIAGNOSIS — Z12.4 CERVICAL CANCER SCREENING: Primary | ICD-10-CM

## 2024-01-11 PROCEDURE — 1159F MED LIST DOCD IN RCRD: CPT | Mod: CPTII,S$GLB,, | Performed by: STUDENT IN AN ORGANIZED HEALTH CARE EDUCATION/TRAINING PROGRAM

## 2024-01-11 PROCEDURE — 99999 PR PBB SHADOW E&M-EST. PATIENT-LVL III: CPT | Mod: PBBFAC,,, | Performed by: STUDENT IN AN ORGANIZED HEALTH CARE EDUCATION/TRAINING PROGRAM

## 2024-01-11 PROCEDURE — 88175 CYTOPATH C/V AUTO FLUID REDO: CPT | Performed by: PATHOLOGY

## 2024-01-11 PROCEDURE — 3008F BODY MASS INDEX DOCD: CPT | Mod: CPTII,S$GLB,, | Performed by: STUDENT IN AN ORGANIZED HEALTH CARE EDUCATION/TRAINING PROGRAM

## 2024-01-11 PROCEDURE — 3074F SYST BP LT 130 MM HG: CPT | Mod: CPTII,S$GLB,, | Performed by: STUDENT IN AN ORGANIZED HEALTH CARE EDUCATION/TRAINING PROGRAM

## 2024-01-11 PROCEDURE — 3078F DIAST BP <80 MM HG: CPT | Mod: CPTII,S$GLB,, | Performed by: STUDENT IN AN ORGANIZED HEALTH CARE EDUCATION/TRAINING PROGRAM

## 2024-01-11 PROCEDURE — 99386 PREV VISIT NEW AGE 40-64: CPT | Mod: S$GLB,,, | Performed by: STUDENT IN AN ORGANIZED HEALTH CARE EDUCATION/TRAINING PROGRAM

## 2024-01-11 PROCEDURE — 87624 HPV HI-RISK TYP POOLED RSLT: CPT | Performed by: STUDENT IN AN ORGANIZED HEALTH CARE EDUCATION/TRAINING PROGRAM

## 2024-01-11 PROCEDURE — 88141 CYTOPATH C/V INTERPRET: CPT | Mod: ,,, | Performed by: PATHOLOGY

## 2024-01-11 PROCEDURE — 1160F RVW MEDS BY RX/DR IN RCRD: CPT | Mod: CPTII,S$GLB,, | Performed by: STUDENT IN AN ORGANIZED HEALTH CARE EDUCATION/TRAINING PROGRAM

## 2024-01-11 NOTE — PROGRESS NOTES
HPI: Pt is a 53 y.o.  female who presents for routine annual exam.   No gyn complaints    Contraception: , LMP > 12 months ago  STI screening: declines, not sexually active    Cervical cancer screening:    Most recent: 2019 abnormal    History abnormal:    2019: ASC-H, HR HPV pos (other)   2014: LSIL >> HERMES 1    Breast cancer screening: UTD TC 9.56%  Colon cancer screening: due/ordered per PCP    Family history breast cancer: ?cousin; no first degree relatives  Family history ovarian cancer: no  Family history colon cancer: no        ROS:  GENERAL: Feeling well overall. Denies fever or chills.   SKIN: Denies rash or lesions.   HEAD: Denies head injury or headache.   NODES: Denies enlarged lymph nodes.   CHEST: Denies chest pain or shortness of breath.   CARDIOVASCULAR: Denies palpitations or left sided chest pain.   ABDOMEN: No abdominal pain, constipation, diarrhea, nausea or vomiting   URINARY: No dysuria, hematuria, or burning on urination.  REPRODUCTIVE: See HPI.   BREASTS: Denies pain, lumps, or nipple discharge.   HEMATOLOGIC: No easy bruisability or excessive bleeding.   MUSCULOSKELETAL: Denies joint pain or swelling.   NEUROLOGIC: Denies syncope or weakness.   PSYCHIATRIC: Denies acute depression or anxiety     PE:   APPEARANCE: Well nourished, well developed, Black or  female in no acute distress.  NODES: no inguinal lymphadenopathy  BREASTS: Symmetrical, no skin changes or visible lesions. No palpable masses, nipple discharge or adenopathy bilaterally.  ABDOMEN: Soft. No tenderness or masses. No distention.   VULVA: No lesions. Normal external female genitalia.  URETHRAL MEATUS: Normal size and location, no lesions, no prolapse.  URETHRA: No masses, tenderness, or prolapse.  VAGINA: Moist. No lesions or lacerations noted. No abnormal discharge present. No odor present.   CERVIX: No lesions or discharge. No cervical motion tenderness.   UTERUS: Normal size, regular shape, mobile,  non-tender.  ADNEXA: No tenderness. No fullness or masses palpated in the adnexal regions.   ANUS PERINEUM: Normal.      Diagnosis:  1. Cervical cancer screening        Plan:     Orders Placed This Encounter    HPV High Risk Genotypes, PCR    Liquid-Based Pap Smear, Screening       Patient was counseled today on the current  ACS guidelines for cervical cytology screening as well as the current recommendations for breast cancer screening.   She was counseled to follow up with her PCP for other routine health maintenance.        RTC 1 year

## 2024-01-16 ENCOUNTER — OFFICE VISIT (OUTPATIENT)
Dept: PRIMARY CARE CLINIC | Facility: CLINIC | Age: 54
End: 2024-01-16
Payer: COMMERCIAL

## 2024-01-16 VITALS
RESPIRATION RATE: 17 BRPM | WEIGHT: 149.69 LBS | DIASTOLIC BLOOD PRESSURE: 70 MMHG | SYSTOLIC BLOOD PRESSURE: 110 MMHG | HEART RATE: 90 BPM | HEIGHT: 64 IN | BODY MASS INDEX: 25.56 KG/M2 | OXYGEN SATURATION: 100 %

## 2024-01-16 DIAGNOSIS — E53.8 B12 DEFICIENCY: ICD-10-CM

## 2024-01-16 DIAGNOSIS — E11.69 TYPE 2 DIABETES MELLITUS WITH HYPERLIPIDEMIA: ICD-10-CM

## 2024-01-16 DIAGNOSIS — E78.5 TYPE 2 DIABETES MELLITUS WITH HYPERLIPIDEMIA: ICD-10-CM

## 2024-01-16 DIAGNOSIS — I10 PRIMARY HYPERTENSION: Primary | Chronic | ICD-10-CM

## 2024-01-16 DIAGNOSIS — D61.818 PANCYTOPENIA: ICD-10-CM

## 2024-01-16 PROCEDURE — 1159F MED LIST DOCD IN RCRD: CPT | Mod: CPTII,S$GLB,, | Performed by: FAMILY MEDICINE

## 2024-01-16 PROCEDURE — 99999 PR PBB SHADOW E&M-EST. PATIENT-LVL III: CPT | Mod: PBBFAC,,, | Performed by: FAMILY MEDICINE

## 2024-01-16 PROCEDURE — 1160F RVW MEDS BY RX/DR IN RCRD: CPT | Mod: CPTII,S$GLB,, | Performed by: FAMILY MEDICINE

## 2024-01-16 PROCEDURE — 3008F BODY MASS INDEX DOCD: CPT | Mod: CPTII,S$GLB,, | Performed by: FAMILY MEDICINE

## 2024-01-16 PROCEDURE — 3074F SYST BP LT 130 MM HG: CPT | Mod: CPTII,S$GLB,, | Performed by: FAMILY MEDICINE

## 2024-01-16 PROCEDURE — 99214 OFFICE O/P EST MOD 30 MIN: CPT | Mod: S$GLB,,, | Performed by: FAMILY MEDICINE

## 2024-01-16 PROCEDURE — 3078F DIAST BP <80 MM HG: CPT | Mod: CPTII,S$GLB,, | Performed by: FAMILY MEDICINE

## 2024-01-16 RX ORDER — LANOLIN ALCOHOL/MO/W.PET/CERES
1000 CREAM (GRAM) TOPICAL DAILY
COMMUNITY

## 2024-01-16 NOTE — PROGRESS NOTES
"Subjective:       Patient ID: Lacey Lange is a 53 y.o. female.    Chief Complaint: Follow-up (4week)    Here for routine follow-up blood pressure has been good on home monitoring, compliant with amlodipine.  Has been taking oral B12, did not tolerate B12 injections.  Still gets occasional lightheadedness, but overall improvement.  No lower extremity swelling.  Blood sugar has been high at times, but admits to some dietary indiscretions.  Scheduled for labs and follow-up with diabetes management next week    Follow-up  Associated symptoms include fatigue. Pertinent negatives include no chest pain, nausea or vomiting.     Review of Systems   Constitutional:  Positive for fatigue.   Respiratory:  Negative for shortness of breath.    Cardiovascular:  Negative for chest pain.   Gastrointestinal:  Negative for diarrhea, nausea and vomiting.   Neurological:  Positive for light-headedness (occasional).   Psychiatric/Behavioral:  Negative for agitation and confusion.        Objective:      Vitals:    01/16/24 1526   BP: 110/70   BP Location: Right arm   Patient Position: Sitting   BP Method: Medium (Manual)   Pulse: 90   Resp: 17   SpO2: 100%   Weight: 67.9 kg (149 lb 11.1 oz)   Height: 5' 4" (1.626 m)     BP Readings from Last 5 Encounters:   01/16/24 110/70   01/11/24 100/66   01/05/24 128/64   12/27/23 115/60   12/15/23 124/84     Wt Readings from Last 5 Encounters:   01/16/24 67.9 kg (149 lb 11.1 oz)   01/11/24 66.7 kg (147 lb 0.8 oz)   01/05/24 67 kg (147 lb 9.6 oz)   12/15/23 68.3 kg (150 lb 9.2 oz)   12/13/23 68.9 kg (151 lb 14.4 oz)     Physical Exam  Vitals and nursing note reviewed.   Constitutional:       General: She is not in acute distress.     Appearance: Normal appearance. She is well-developed.   HENT:      Head: Normocephalic and atraumatic.   Cardiovascular:      Rate and Rhythm: Normal rate and regular rhythm.      Heart sounds: Normal heart sounds.   Pulmonary:      Effort: Pulmonary effort is " normal.      Breath sounds: Normal breath sounds.   Musculoskeletal:      Right lower leg: No edema.      Left lower leg: No edema.   Skin:     General: Skin is warm and dry.   Neurological:      Mental Status: She is alert and oriented to person, place, and time.   Psychiatric:         Mood and Affect: Mood normal.         Behavior: Behavior normal.         Lab Results   Component Value Date    WBC 7.11 01/03/2024    HGB 10.7 (L) 01/03/2024    HCT 34.5 (L) 01/03/2024     01/03/2024    CHOL 165 09/09/2023    TRIG 54 09/09/2023    HDL 39 (L) 09/09/2023    ALT 18 12/06/2023    AST 36 12/06/2023     12/06/2023    K 3.4 (L) 12/06/2023     12/06/2023    CREATININE 0.8 12/06/2023    BUN 13 12/06/2023    CO2 21 (L) 12/06/2023    TSH 1.229 12/06/2023    HGBA1C 6.0 (H) 12/11/2023      Assessment:       1. Primary hypertension    2. Pancytopenia    3. B12 deficiency    4. Type 2 diabetes mellitus with hyperlipidemia        Plan:       Primary hypertension  Well controlled  Pancytopenia  -     CBC Auto Differential; Future; Expected date: 01/16/2024  Improving  B12 deficiency  Continue oral supplementation  Type 2 diabetes mellitus with hyperlipidemia  Relatively stable off meds for now    Medication List with Changes/Refills   Current Medications    AMLODIPINE (NORVASC) 5 MG TABLET    Take 1 tablet (5 mg total) by mouth once daily.    CYANOCOBALAMIN (VITAMIN B-12) 1000 MCG TABLET    Take 1,000 mcg by mouth once daily.    DEXCOM G7 SENSOR SUSIE    Use for continuous glucose monitoring; replace sensor every 10 days    GLYCERIN ADULT SUPPOSITORY    Place 1 suppository rectally as needed for Constipation.    ONETOUCH DELICA PLUS LANCET 33 GAUGE MISC    1 lancet  by Other route 3 (three) times daily.    ONETOUCH ULTRA TEST STRP    1 strip by Other route 3 (three) times daily.    POLYETHYLENE GLYCOL (GLYCOLAX) 17 GRAM PWPK    Take 17 g by mouth once daily.

## 2024-01-18 ENCOUNTER — PATIENT OUTREACH (OUTPATIENT)
Dept: ADMINISTRATIVE | Facility: HOSPITAL | Age: 54
End: 2024-01-18
Payer: COMMERCIAL

## 2024-01-18 LAB
HPV HR 12 DNA SPEC QL NAA+PROBE: NEGATIVE
HPV16 AG SPEC QL: NEGATIVE
HPV18 DNA SPEC QL NAA+PROBE: NEGATIVE
LEFT EYE DM RETINOPATHY: NEGATIVE
RIGHT EYE DM RETINOPATHY: NEGATIVE

## 2024-01-18 NOTE — PROGRESS NOTES
Health Maintenance Due   Topic Date Due    High Dose Statin  Never done    Shingles Vaccine (1 of 2) Never done     Population Health Chart Review & Patient Outreach Details      Further Action Needed If Patient Returns Outreach:      Received diabetic eye exam records, done 2024, uploaded.  updated.       Updates Requested / Reviewed:     [x]  Care Everywhere    []     []  External Sources ([facility], [facility], [facility], etc.)    [] [facility]   [] [facility]   [] Other:    []  Care Team Updated   []  Removed  or Duplicate Orders   [x]  Immunization Reconciliation Completed / Queried    [x] [place]   [] [place]   [] [place]   [] [place]      Health Maintenance Topics Addressed and Outreach Outcomes / Actions Taken:             Breast Cancer Screening []  Mammogram Order Placed    []  Mammogram Screening Scheduled    []  External Records Requested & Care Team Updated if Applicable    []  External Records Uploaded & Care Team Updated if Applicable    []  Pt Declined Scheduling Mammogram    []  Pt Will Schedule with External Provider / Order Routed & Care Team Updated if Applicable              Cervical Cancer Screening []  Pap Smear Scheduled in Primary Care or OBGYN    []  External Records Requested & Care Team Updated if Applicable       []  External Records Uploaded, Care Team Updated, & History Updated if Applicable    []  Patient Declined Scheduling Pap Smear    []  Patient Will Schedule with External Provider & Care Team Updated if Applicable                  Colorectal Cancer Screening []  Colonoscopy Case Request / Referral / Home Test Order Placed    []  External Records Requested & Care Team Updated if Applicable    []  External Records Uploaded, Care Team Updated, & History Updated if Applicable    []  Patient Declined Completing Colon Cancer Screening    []  Patient Will Schedule with External Provider & Care Team Updated if Applicable    []  Fit Kit Mailed (add the SmartPhrase under  additional notes)    []  Reminded Patient to Complete Home Test                Diabetic Eye Exam []  Eye Exam Screening Order Placed    []  Eye Camera Scheduled or Optometry/Ophthalmology Referral Placed    []  External Records Requested & Care Team Updated if Applicable    [x]  External Records Uploaded, Care Team Updated, & History Updated if Applicable    []  Patient Declined Scheduling Eye Exam    []  Patient Will Schedule with External Provider & Care Team Updated if Applicable             Blood Pressure Control []  Primary Care Follow Up Visit Scheduled     []  Remote Blood Pressure Reading Captured    []  Patient Declined Remote Reading or Scheduling Appt - Escalated to PCP    []  Patient Will Call Back or Send Portal Message with Reading                 HbA1c & Other Labs []  Overdue Lab(s) Ordered    []  Overdue Lab(s) Scheduled    []  External Records Uploaded & Care Team Updated if Applicable    []  Primary Care Follow Up Visit Scheduled     []  Reminded Patient to Complete A1c Home Test    []  Patient Declined Scheduling Labs or Will Call Back to Schedule    []  Patient Will Schedule with External Provider / Order Routed, & Care Team Updated if Applicable           Primary Care Appointment []  Primary Care Appt Scheduled    []  Patient Declined Scheduling or Will Call Back to Schedule    []  Pt Established with External Provider, Updated Care Team, & Informed Pt to Notify Payor if Applicable           Medication Adherence /    Statin Use []  Primary Care Appointment Scheduled    []  Patient Reminded to  Prescription    []  Patient Declined, Provider Notified if Needed    []  Sent Provider Message to Review to Evaluate Pt for Statin, Add Exclusion Dx Codes, Document   Exclusion in Problem List, Change Statin Intensity Level to Moderate or High Intensity if Applicable                Osteoporosis Screening []  Dexa Order Placed    []  Dexa Appointment Scheduled    []  External Records Requested &  Care Team Updated    []  External Records Uploaded, Care Team Updated, & History Updated if Applicable    []  Patient Declined Scheduling Dexa or Will Call Back to Schedule    []  Patient Will Schedule with External Provider / Order Routed & Care Team Updated if Applicable       Additional Notes:

## 2024-01-19 LAB
FINAL PATHOLOGIC DIAGNOSIS: ABNORMAL
Lab: ABNORMAL

## 2024-01-23 ENCOUNTER — TELEPHONE (OUTPATIENT)
Dept: OBSTETRICS AND GYNECOLOGY | Facility: CLINIC | Age: 54
End: 2024-01-23
Payer: COMMERCIAL

## 2024-01-23 ENCOUNTER — TELEPHONE (OUTPATIENT)
Dept: PRIMARY CARE CLINIC | Facility: CLINIC | Age: 54
End: 2024-01-23
Payer: COMMERCIAL

## 2024-01-23 NOTE — TELEPHONE ENCOUNTER
Called pt regarding message.Informed pt follow up in 6 months for regular checkup. Pt stated has started vitamins OTC.

## 2024-01-23 NOTE — TELEPHONE ENCOUNTER
----- Message from Jessie Woody sent at 1/23/2024  2:17 PM CST -----  Contact: [phone]  Patient is returning a phone call.  Who left a message for the patient: Dr Potter's office  Does patient know what this is regarding:  yes  Would you like a call back, or a response through your [facility] portal?:   phone  Comments:

## 2024-01-23 NOTE — TELEPHONE ENCOUNTER
Called to schedule pt. She refused to schedule for colpo. I advised that she speaks with Dr. Esparza regarding not scheduling this procedure.   ----- Message from Taan Esparza MD sent at 1/23/2024  1:26 PM CST -----  Please sched colpo

## 2024-01-23 NOTE — TELEPHONE ENCOUNTER
Called to schedule pt for colposcopy. She refused to schedule. Says she doesn't want to have it done because she only sees negative HPV results. I advised her that her pap had some abnormalities and that Dr. Esparza recommended a biopsy to rule out any cancerous cells. She continued to deny appointment. I advised that she speak to with Dr. Esparza about the importance of the biopsy.    Ronni Jc is a 67 year old male presents to the office today for a 6-month f/u (follow up) of chronic health concerns . He has had labs completed prior to his appointment today and these will be reviewed with him. Medications will be refilled accordingly if needed.    1.  He generally checks blood sugars once daily and reports these average 140-150.  Denies any signs or symptoms of hypoglycemia.  Taking his medications as prescribed and denies any problems or side effects.  2.  No other new concerns or complaints reported.      Past Medical History:   Diagnosis Date   • Type II or unspecified type diabetes mellitus without mention of complication, not stated as uncontrolled 2/5/2014       Current Outpatient Medications   Medication Sig Dispense Refill   • sitaGLIPTIN-metFORMIN (JANUMET)  MG per tablet Take 1 tablet by mouth 2 times daily (with meals). 60 tablet 5   • blood glucose (TRUE METRIX BLOOD GLUCOSE TEST) test strip Test blood sugar once daily as directed. 50 each 2   • atorvastatin (LIPITOR) 10 MG tablet Take 1 tablet by mouth daily. 90 tablet 4   • Blood Glucose Calibration (TRUECONTROL GLUCOSE CONT LEV 1) Liquid Use to calibrate monitor every time new bottle of strips is opened. 1 each 3   • triamcinolone (ARISTOCORT) 0.1 % ointment Apply topically 2 times daily as needed (rash) 60 g 1   • fluocinonide (LIDEX) 0.05 % ointment Apply topically 2 times daily as needed for rash. 60 g 1   • SOFTCLIX LANCETS Misc Use to test blood sugars daily. 100 each 6   • Calcium Acetate, Phos Binder, (CALCIUM ACETATE PO) Take by mouth 2 times daily.     • Multiple Vitamins-Minerals (MULTIVITAMIN PO) Take  by mouth.     • Flaxseed, Linseed, (FLAX SEEDS PO) Take  by mouth.     • Glucos-Chondroit-Hyaluron-MSM (GLUCOSAMINE CHONDROITIN JOINT PO) Take  by mouth.       No current facility-administered medications for this visit.        Social History     Socioeconomic History   • Marital status: /Civil Union      Spouse name: Светлана   • Number of children: 2   • Years of education: 12    • Highest education level: Not on file   Social Needs   • Financial resource strain: Not on file   • Food insecurity - worry: Not on file   • Food insecurity - inability: Not on file   • Transportation needs - medical: Not on file   • Transportation needs - non-medical: Not on file   Occupational History   • Occupation: Retired   Tobacco Use   • Smoking status: Former Smoker     Years: 7.00     Types: Cigarettes     Start date: 1973     Last attempt to quit: 1980     Years since quittin.9   • Smokeless tobacco: Never Used   Substance and Sexual Activity   • Alcohol use: Yes     Alcohol/week: 3.6 oz     Types: 6 Cans of beer per week     Comment: Occasionally drinks lite beer   • Drug use: No   • Sexual activity: Not on file   Other Topics Concern   • Not on file   Social History Narrative   • Not on file       Immunization History   Administered Date(s) Administered   • Influenza, high dose seasonal, preservative-free 2018   • Influenza, injectable, quadrivalent, preservative-free 10/30/2017   • Pneumococcal Conjugate 13 valent 2018   • Tdap 10/30/2006, 04/15/2011   • Zoster Shingles 2014       ALLERGIES:  No Known Allergies    Lab Results   Component Value Date    SODIUM 139 2018    POTASSIUM 5.2 (H) 2018    CHLORIDE 104 2018    CO2 27 2018    BUN 18 2018    CREATININE 1.10 2018    GLUCOSE 153 (H) 2018     Hemoglobin A1C (%)   Date Value   2018 6.8 (H)     Lab Results   Component Value Date    CHOLESTEROL 141 2018    HDL 54 2018    CALCLDL 74 2018    TRIGLYCERIDE 67 2018     PSA, Total (ng/mL)   Date Value   2018 0.29     MICROALBUMIN, UA (TTL) (mg/dL)   Date Value   2018 0.50   10/30/2017 0.91     CREATININE, URINE (TOTAL) (mg/dL)   Date Value   2018 46.70   10/30/2017 30.50     MICROALBUMIN/CREATININE   Date Value    11/16/2018 10.7 mg/g   10/30/2017 29.8 mcg/mg       Physical Exam:    Visit Vitals  /78   Pulse 52   Ht 6' 1\" (1.854 m)   Wt 84.4 kg   BMI 24.54 kg/m²       General: Ronni Jc is a 67 year old male who is a pleasant, well-developed, well-nourished individual. He is clean and well groomed, able to clearly articulate their needs and concerns. Mood and affect are appropriate.  Skin: Clean, warm and dry without concerning rash or lesion.  HEENT: Normocephalic. Eyes: Pupils are equal, round and reactive to light, sclerae and conjunctivae without injection, crusting or drainage. Neck is supple without any lymphadenopathy or thyromegaly.  No JVD or bruit.  Ear canals partially blocked by cerumen.  TMs without fluid or erythema.  Cardiovascular: Regular rate and rhythm without murmur, S1 and S2 heart tones are present. No rub or gallop.  Lungs: Clear to auscultation throughout without wheezes, rhonchi, or rales. Respirations are even and unlabored, chest expansion is symmetrical.  Abdomen: No costovertebral angle tenderness, soft, with active and present bowel sounds x4 quadrants. No rebound or guarding. No masses or hepatosplenomegaly.    Peripheral Vascular: No dependent edema, no clubbing or cyanosis. Sensation is intact to distal extremities.  Diabetic Foot Exam Documentation     Normal Bilateral Foot Exam: Skin integrity is normal. Dorsalis pedis and posterior tibial pulses are present.  Pressure sensation using the Nixa-Lena monofilament is present.  Musculoskeletal: Active range of motion to bilateral upper and lower extremities without deficits. Strength is 5/5 to bilateral upper and lower extremities with flexion and extension. No palpable point tenderness to the cervical, thoracic or lumbar spine.  Neurologic: Cranial nerves 2-12 are grossly intact, speech was clear, gait was steady, deep tendon reflexes +2 to bilateral upper and lower extremities. No other focal findings.    Assessment and  Plan:    1. Type 2 diabetes mellitus without complication, without long-term current use of insulin (CMS/HCC)  Good control.  Continue current treatment and routine six-month follow-up or sooner as needed.  - sitaGLIPTIN-metFORMIN (JANUMET)  MG per tablet; Take 1 tablet by mouth 2 times daily (with meals).  Dispense: 60 tablet; Refill: 5    2. Mixed hyperlipidemia  Also under good control.  Continue current management and routine follow-up at future visits.

## 2024-01-23 NOTE — TELEPHONE ENCOUNTER
----- Message from Dwight Diego sent at 1/23/2024  1:21 PM CST -----  Contact: Pt  [phone]  Patient is returning a phone call.  Who left a message for the patient: Dr Potter  Does patient know what this is regarding:  yes  Would you like a call back, or a response through your [facility] portal?:   call  Comments:

## 2024-01-24 ENCOUNTER — TELEPHONE (OUTPATIENT)
Dept: OBSTETRICS AND GYNECOLOGY | Facility: CLINIC | Age: 54
End: 2024-01-24
Payer: COMMERCIAL

## 2024-01-25 ENCOUNTER — OFFICE VISIT (OUTPATIENT)
Dept: DIABETES | Facility: CLINIC | Age: 54
End: 2024-01-25
Payer: COMMERCIAL

## 2024-01-25 VITALS
HEART RATE: 67 BPM | SYSTOLIC BLOOD PRESSURE: 139 MMHG | BODY MASS INDEX: 24.59 KG/M2 | DIASTOLIC BLOOD PRESSURE: 84 MMHG | HEIGHT: 64 IN | OXYGEN SATURATION: 97 % | WEIGHT: 144 LBS

## 2024-01-25 DIAGNOSIS — E78.5 DYSLIPIDEMIA, GOAL LDL BELOW 100: ICD-10-CM

## 2024-01-25 DIAGNOSIS — F06.4 ANXIETY DISORDER DUE TO MEDICAL CONDITION: ICD-10-CM

## 2024-01-25 DIAGNOSIS — I10 PRIMARY HYPERTENSION: Chronic | ICD-10-CM

## 2024-01-25 DIAGNOSIS — E11.69 TYPE 2 DIABETES MELLITUS WITH HYPERLIPIDEMIA: Primary | ICD-10-CM

## 2024-01-25 DIAGNOSIS — E78.5 TYPE 2 DIABETES MELLITUS WITH HYPERLIPIDEMIA: Primary | ICD-10-CM

## 2024-01-25 DIAGNOSIS — D64.9 NORMOCYTIC ANEMIA: ICD-10-CM

## 2024-01-25 PROBLEM — E66.3 OVERWEIGHT (BMI 25.0-29.9): Status: RESOLVED | Noted: 2023-09-21 | Resolved: 2024-01-25

## 2024-01-25 PROCEDURE — 3008F BODY MASS INDEX DOCD: CPT | Mod: CPTII,S$GLB,, | Performed by: NURSE PRACTITIONER

## 2024-01-25 PROCEDURE — 99214 OFFICE O/P EST MOD 30 MIN: CPT | Mod: S$GLB,,, | Performed by: NURSE PRACTITIONER

## 2024-01-25 PROCEDURE — 3044F HG A1C LEVEL LT 7.0%: CPT | Mod: CPTII,S$GLB,, | Performed by: NURSE PRACTITIONER

## 2024-01-25 PROCEDURE — 99999 PR PBB SHADOW E&M-EST. PATIENT-LVL IV: CPT | Mod: PBBFAC,,, | Performed by: NURSE PRACTITIONER

## 2024-01-25 PROCEDURE — 3075F SYST BP GE 130 - 139MM HG: CPT | Mod: CPTII,S$GLB,, | Performed by: NURSE PRACTITIONER

## 2024-01-25 PROCEDURE — 95251 CONT GLUC MNTR ANALYSIS I&R: CPT | Mod: S$GLB,,, | Performed by: NURSE PRACTITIONER

## 2024-01-25 PROCEDURE — 1159F MED LIST DOCD IN RCRD: CPT | Mod: CPTII,S$GLB,, | Performed by: NURSE PRACTITIONER

## 2024-01-25 PROCEDURE — 3079F DIAST BP 80-89 MM HG: CPT | Mod: CPTII,S$GLB,, | Performed by: NURSE PRACTITIONER

## 2024-01-25 PROCEDURE — 1160F RVW MEDS BY RX/DR IN RCRD: CPT | Mod: CPTII,S$GLB,, | Performed by: NURSE PRACTITIONER

## 2024-01-25 NOTE — PROGRESS NOTES
CC:   Chief Complaint   Patient presents with    Diabetes Mellitus       HPI: Lacey Lange is a 53 y.o. female presents for a follow up visit today for the management of T2DM    She was diagnosed with Type 2 diabetes in July 2023 and was started on Metformin BID   Prescribed Trulicity but never received it     2019- with prediabetes   2013- gestational diabetes   Lost a baby at 8 months prior to her current daughter       Family hx of diabetes: mother, father,   Hospitalized for diabetes: denies   Insulin therapy: never on insulin     No personal or FH of thyroid cancer or personal of pancreatic cancer or pancreatitis.         Our last visit Was in September of 2023  At that visit we discussed continuing the metformin 500 mg daily  Change the metformin to extended release tablets and take with food  We discussed the for sugars were running high she could add in a 2nd metformin  We started on the Dexcom personal CGM  See attached download  Her A1c has improved to 5.1%  ELAINA Flores started her on Glimepiride in November when he discontinued the metformin ---but it was then discontinued in December by Dr. LILLY Soriano due to hypoglycemia   He started her on Jardiance -- but it is currently on hold   Her reading are well controlled on dexcom   A1c is 5.1%-- of note she is anemic and has received blood products in the last 3 months   She was seen by hematology for anemia on 1/2/2024  Off diabetes medications since about December the 19th   Weight from 176#  (September 2023) to 144# today (1/25/2024)                DIABETES COMPLICATIONS: none      Diabetes Management Status    ASA:  No    Statin: not taking statin   ACE/ARB: not taking     Screening or Prevention Patient's value Goal Complete/Controlled?   HgA1C Testing and Control   Lab Results   Component Value Date    HGBA1C 5.1 01/20/2024      Annually/Less than 8% Yes   Lipid profile : 09/09/2023 Annually Yes   LDL control Lab Results   Component Value Date     LDLCALC 115.2 09/09/2023    Annually/Less than 100 mg/dl  No   Nephropathy screening Lab Results   Component Value Date    LABMICR 17.0 08/26/2023     Lab Results   Component Value Date    PROTEINUA Negative 11/26/2023    Annually Yes   Blood pressure BP Readings from Last 1 Encounters:   01/25/24 139/84    Less than 140/90 Yes   Dilated retinal exam : 01/18/2024 Annually Yes   Foot exam   : 09/21/2023 Annually No       CURRENT A1C:    Hemoglobin A1C   Date Value Ref Range Status   01/20/2024 5.1 4.0 - 5.6 % Final     Comment:     ADA Screening Guidelines:  5.7-6.4%  Consistent with prediabetes  >or=6.5%  Consistent with diabetes    High levels of fetal hemoglobin interfere with the HbA1C  assay. Heterozygous hemoglobin variants (HbS, HgC, etc)do  not significantly interfere with this assay.   However, presence of multiple variants may affect accuracy.     12/11/2023 6.0 (H) 4.0 - 5.6 % Final     Comment:     ADA Screening Guidelines:  5.7-6.4%  Consistent with prediabetes  >or=6.5%  Consistent with diabetes    High levels of fetal hemoglobin interfere with the HbA1C  assay. Heterozygous hemoglobin variants (HbS, HgC, etc)do  not significantly interfere with this assay.   However, presence of multiple variants may affect accuracy.     09/09/2023 6.8 (H) 4.0 - 5.6 % Final     Comment:     ADA Screening Guidelines:  5.7-6.4%  Consistent with prediabetes  >or=6.5%  Consistent with diabetes    High levels of fetal hemoglobin interfere with the HbA1C  assay. Heterozygous hemoglobin variants (HbS, HgC, etc)do  not significantly interfere with this assay.   However, presence of multiple variants may affect accuracy.         GOAL A1C: 6.5% without hypoglycemia     DM MEDICATIONS USED IN THE PAST: Metformin   Metfomrin XR- stopped due to poor breathing ???-- but was also extremely anemic   Glimepiride --- hypoglycemia   Jardiance-   Trulicity  -- never started       CURRENT DIABETES MEDICATIONS: None  -- off all medications  since mid December        BLOOD GLUCOSE MONITORING:    Sensor type: Dexcom G7   Average BG readin  Time in range: 99%   <1% high   0% very high   0% low   0% very low   6.2 %- GMI   Site change:  q10 days    2 weeks prior - average 116  100% in range   6.1% GMI       Sensor was downloaded in clinic today and reviewed with patient.   Please see attached document for download.         HYPOGLYCEMIA:  None        MEALS: eating 3 meals per day   Altered diet   Cut back on CHO   Drinks: water        CURRENT EXERCISE:  No formal       Review of Systems  Review of Systems   Constitutional:  Negative for appetite change, fatigue (improving) and unexpected weight change.   HENT:  Negative for trouble swallowing.    Eyes:  Negative for visual disturbance.   Respiratory:  Negative for shortness of breath.    Cardiovascular:  Negative for chest pain.   Gastrointestinal:  Negative for abdominal distention, abdominal pain, diarrhea and nausea.   Endocrine: Negative for polydipsia, polyphagia and polyuria.   Genitourinary:         No Nocturia    Musculoskeletal:  Positive for back pain.   Skin:  Negative for wound.   Neurological:  Positive for dizziness (occ- but reports that it is improving). Negative for headaches.   Psychiatric/Behavioral:  The patient is nervous/anxious.        Physical Exam   Physical Exam  Vitals and nursing note reviewed.   Constitutional:       General: She is not in acute distress.     Appearance: She is well-developed. She is not ill-appearing.   HENT:      Head: Normocephalic and atraumatic.      Right Ear: External ear normal.      Left Ear: External ear normal.      Nose: Nose normal.   Neck:      Thyroid: No thyromegaly.      Trachea: No tracheal deviation.   Cardiovascular:      Rate and Rhythm: Normal rate and regular rhythm.      Heart sounds: No murmur heard.  Pulmonary:      Effort: Pulmonary effort is normal. No respiratory distress.      Breath sounds: Normal breath sounds.   Abdominal:       Palpations: Abdomen is soft.      Tenderness: There is no abdominal tenderness.      Hernia: No hernia is present.   Musculoskeletal:      Cervical back: Normal range of motion and neck supple.   Skin:     General: Skin is warm and dry.      Capillary Refill: Capillary refill takes less than 2 seconds.      Findings: No rash.   Neurological:      Mental Status: She is alert and oriented to person, place, and time.      Cranial Nerves: No cranial nerve deficit.   Psychiatric:         Mood and Affect: Mood is anxious.         Behavior: Behavior normal.         Judgment: Judgment normal.         FOOT EXAMINATION: Appropriate  footwear       Lab Results   Component Value Date    TSH 1.229 12/06/2023           Type 2 diabetes mellitus with hyperlipidemia  Controlled   A1c is skewed due to anemia and blood products   BG well controlled on Dexcom download - see attached   Off medications and controlled         -- Medication Changes:  remain off medications.   Discussed that she could also stop using the dexcom and just use as needed.   If she goes back to finger sticks- check daily at alternating times.   Before meals mostly   If checked after a meal - 2 hours after   If running above 150's   Put dexcom G7 back on to reassess and let me know       -- Reviewed goals of therapy are to get the best control we can without hypoglycemia.    -- Advised frequent self blood glucose monitoring.  Patient encouraged to document glucose results and bring them to every clinic visit. Has dexcom 7 - supplies from pharmacy   -- Hypoglycemia precautions discussed. Instructed on precautions before driving.    -- Call for Bg repeatedly < 70 or > 180.   -- Close adherence to lifestyle changes recommended.   -- Periodic follow ups for eye evaluations, foot care and dental care suggested.          Primary hypertension  BP goal is < 140/90.   Controlled   Blood pressure goals discussed with patient  On norvasc- reports compliance      Dyslipidemia, goal LDL below 100  She has not started the statin   I think it is reasonable to hold off until feeling better.   Discussed ADA recommendations and goals   LDL goal <100    Normocytic anemia  Skews A1c level   She is following with hematology         I spent a total of 30 minutes on the day of the visit.This includes face to face time and non-face to face time preparing to see the patient (eg, review of tests), obtaining and/or reviewing separately obtained history, documenting clinical information in the electronic or other health record, independently interpreting results and communicating results to the patient/family/caregiver, or care coordinator.        Follow up in about 6 months (around 7/25/2024).  Follow up with me in 6 months with labs prior         Orders Placed This Encounter   Procedures    Hemoglobin A1C     Standing Status:   Future     Standing Expiration Date:   7/25/2025    Comprehensive Metabolic Panel     Standing Status:   Future     Standing Expiration Date:   7/25/2025    Lipid Panel     Standing Status:   Future     Standing Expiration Date:   7/25/2025    Microalbumin/Creatinine Ratio, Urine     Standing Status:   Future     Standing Expiration Date:   7/25/2025     Order Specific Question:   Specimen Source     Answer:   Urine    TSH     Standing Status:   Future     Standing Expiration Date:   7/25/2025    Fructosamine     Standing Status:   Future     Standing Expiration Date:   7/25/2025       Recommendations were discussed with the patient in detail  The patient verbalized understanding and agrees with the plan outlined as above.     This note was partly generated with [employer] voice recognition software. I apologize for any possible typographical errors.

## 2024-01-25 NOTE — ASSESSMENT & PLAN NOTE
Controlled   A1c is skewed due to anemia and blood products   BG well controlled on Dexcom download - see attached   Off medications and controlled         -- Medication Changes:  remain off medications.   Discussed that she could also stop using the dexcom and just use as needed.   If she goes back to finger sticks- check daily at alternating times.   Before meals mostly   If checked after a meal - 2 hours after   If running above 150's   Put dexcom G7 back on to reassess and let me know       -- Reviewed goals of therapy are to get the best control we can without hypoglycemia.    -- Advised frequent self blood glucose monitoring.  Patient encouraged to document glucose results and bring them to every clinic visit. Has dexcom 7 - supplies from pharmacy   -- Hypoglycemia precautions discussed. Instructed on precautions before driving.    -- Call for Bg repeatedly < 70 or > 180.   -- Close adherence to lifestyle changes recommended.   -- Periodic follow ups for eye evaluations, foot care and dental care suggested.

## 2024-01-25 NOTE — ASSESSMENT & PLAN NOTE
BP goal is < 140/90.   Controlled   Blood pressure goals discussed with patient  On norvasc- reports compliance

## 2024-02-01 ENCOUNTER — TELEPHONE (OUTPATIENT)
Dept: GASTROENTEROLOGY | Facility: CLINIC | Age: 54
End: 2024-02-01
Payer: COMMERCIAL

## 2024-02-01 ENCOUNTER — PATIENT MESSAGE (OUTPATIENT)
Dept: CARDIOLOGY | Facility: CLINIC | Age: 54
End: 2024-02-01
Payer: COMMERCIAL

## 2024-02-01 ENCOUNTER — PATIENT MESSAGE (OUTPATIENT)
Dept: GASTROENTEROLOGY | Facility: CLINIC | Age: 54
End: 2024-02-01
Payer: COMMERCIAL

## 2024-02-01 NOTE — TELEPHONE ENCOUNTER
I called the patient and sent her new instructions to her my chart .    ----- Message from Vielka Leon MA sent at 2/1/2024  1:54 PM CST -----  Pt will need new instructions for GaviLyte-G  ----- Message -----  From: Cary Limon  Sent: 2/1/2024  12:20 PM CST  To: Katiana Mora Staff    Type:  Needs Medical Advice    Who Called: pt    Pharmacy name and phone #:  [id number] - [place] - [address] AT [facility] [address] [address]Bfhpk: [phone] Fax: [phone]: Not open 24 hours     Would the patient rather a call back or a response via [facility]? Call   Best Call Back Number: [phone]  Additional Information: requesting to have GaviLyte-G sent over for procedure to pharmacy only one that is covered under insurance

## 2024-02-01 NOTE — TELEPHONE ENCOUNTER
I spoke with the patient about her Suprep instructions and sent them to her in her my chart.    ----- Message from Vielka Leon MA sent at 2/1/2024 10:31 AM CST -----    ----- Message -----  From: Kathi Alba  Sent: 2/1/2024  10:29 AM CST  To: Katiana Mora Staff    Type:  Questions    Who Called:pt  Does the patient know what this is regarding?:questions  Would the patient rather a call back or a response via [facility]? call  Best Call Back Number: [phone]  Additional Information:

## 2024-02-14 ENCOUNTER — TELEPHONE (OUTPATIENT)
Dept: PRIMARY CARE CLINIC | Facility: CLINIC | Age: 54
End: 2024-02-14
Payer: COMMERCIAL

## 2024-02-14 NOTE — TELEPHONE ENCOUNTER
----- Message from Dwight Diego sent at 2/14/2024  3:20 PM CST -----  Contact: Pt  [phone]  Pt called in regards to getting note to return to work pt states she needs this note on today please call and advise.

## 2024-02-14 NOTE — TELEPHONE ENCOUNTER
----- Message from Jessie Woody sent at 2/12/2024  1:17 PM CST -----  Contact: [phone]  Patient called, stated that she is been asked of a doctor notes to go back to work. Please advise. Thank you.

## 2024-02-15 ENCOUNTER — TELEPHONE (OUTPATIENT)
Dept: PRIMARY CARE CLINIC | Facility: CLINIC | Age: 54
End: 2024-02-15
Payer: COMMERCIAL

## 2024-02-15 NOTE — TELEPHONE ENCOUNTER
"----- Message from Danisha Rachel Hickman sent at 2/15/2024  6:08 AM CST -----  Regarding: Return to work today (Urgent)  Contact: Pt @[phone]  Pt is calling to speak with someone in the office to request a "work excuse" letter to return back to work. Pt is asking for a return call back to advise that the letter has been completed. Please call pt. Thanks.              Additional Information: Please date the note with today's date.      "

## 2024-02-15 NOTE — TELEPHONE ENCOUNTER
----- Message from Yoli Gonzalez sent at 2/15/2024 10:49 AM CST -----  Contact: [phone]  Patient is returning a phone call.  Who left a message for the patient: Halie Weller,  Does patient know what this is regarding:  work clearance  Would you like a call back, or a response through your [facility] portal?:    call back   Comments:

## 2024-02-19 ENCOUNTER — TELEPHONE (OUTPATIENT)
Dept: PRIMARY CARE CLINIC | Facility: CLINIC | Age: 54
End: 2024-02-19
Payer: COMMERCIAL

## 2024-02-19 NOTE — TELEPHONE ENCOUNTER
Pt is requesting a clearance letter stating she's clear to return to work. Pt requested letter while PCP was out of office.

## 2024-02-19 NOTE — TELEPHONE ENCOUNTER
----- Message from Beatriz Sánchez sent at 2/19/2024  1:41 PM CST -----  Contact: Self/ [phone]  1MEDICALADVICE     Patient is calling for Medical Advice regarding: okay to return to work     Would like response via [employer]:  Would like a return call     Comments: pt stated that she was told that the nurse would be contacting her today to let her know if the doctor is going to okay her to return to work. Please advise

## 2024-02-19 NOTE — LETTER
February 19, 2024    Lacey Lange  [address] [address]         [facility] [address]  [facility] [address]  Phone: [phone]  Fax: [phone] February 19, 2024     Patient: Lacey Lange   YOB: 1970   Date of Visit: 2/19/2024       To Whom It May Concern:    It is my medical opinion that Lacey Lange may return to full duty immediately with no restrictions.    If you have any questions or concerns, please don't hesitate to call.    Sincerely,     Alfonso Potter MD

## 2024-02-22 DIAGNOSIS — I10 PRIMARY HYPERTENSION: Chronic | ICD-10-CM

## 2024-02-22 RX ORDER — AMLODIPINE BESYLATE 5 MG/1
5 TABLET ORAL DAILY
Qty: 90 TABLET | Refills: 0 | Status: SHIPPED | OUTPATIENT
Start: 2024-02-22 | End: 2024-05-23

## 2024-02-22 NOTE — TELEPHONE ENCOUNTER
----- Message from Raysa Meek sent at 2/22/2024  1:00 PM CST -----  Contact: [phone] Patient  Requesting an RX refill or new RX.  Is this a refill or new RX: new  RX name and strength (copy/paste from chart): amLODIPine (NORVASC) 5 MG tablet   Is this a 30 day or 90 day RX:   Pharmacy name and phone # (copy/paste from chart):    [id number] - [place] - [address] AT [facility] [address] [address]  Phone: [phone] Fax: [phone]

## 2024-02-22 NOTE — TELEPHONE ENCOUNTER
No care due was identified.  [facility] Embedded Care Due Messages. Reference number: [id number].   2/22/2024 1:06:17 PM CST

## 2024-03-01 ENCOUNTER — TELEPHONE (OUTPATIENT)
Dept: PRIMARY CARE CLINIC | Facility: CLINIC | Age: 54
End: 2024-03-01
Payer: COMMERCIAL

## 2024-03-01 NOTE — TELEPHONE ENCOUNTER
Called pt regarding message. Appt has been scheduled for 03/28 at [facility] with PCP. Pt verbalized understanding.

## 2024-03-01 NOTE — TELEPHONE ENCOUNTER
----- Message from Germania Monroe sent at 3/1/2024 11:06 AM CST -----  Patient dropped off paperwork to be completed for Short term Disability. Need by as soon as possible. [phone] .

## 2024-03-01 NOTE — TELEPHONE ENCOUNTER
----- Message from Raysa Meek sent at 3/1/2024 10:21 AM CST -----  Contact: [place] [facility]   Rosita quintana/ [place] is calling in regards to a fax sent over on 02/26/2024 for a request for medical records and disability form. Please fill out sign and fax back . Thank you. Fax # [phone]    REF# [id number]

## 2024-03-28 ENCOUNTER — TELEPHONE (OUTPATIENT)
Dept: PRIMARY CARE CLINIC | Facility: CLINIC | Age: 54
End: 2024-03-28
Payer: COMMERCIAL

## 2024-03-28 NOTE — TELEPHONE ENCOUNTER
----- Message from Letty Acevedo sent at 3/28/2024 11:11 AM CDT -----  Contact: [phone]  1MEDICALADVICE     Patient is calling for Medical Advice regarding:pt missed appt this morning     How long has patient had these symptoms:    Pharmacy name and phone#:    Would like response via Smartesting[employer]:  no     Comments:  Pt states she was told this morning due to being late she had to reschedule she has paper work for short term disability that needs to be filled out by 04/05 please give return call there is no available appts for her

## 2024-04-01 ENCOUNTER — OFFICE VISIT (OUTPATIENT)
Dept: PRIMARY CARE CLINIC | Facility: CLINIC | Age: 54
End: 2024-04-01
Payer: COMMERCIAL

## 2024-04-01 VITALS
HEIGHT: 66 IN | WEIGHT: 140.56 LBS | TEMPERATURE: 97 F | HEART RATE: 66 BPM | BODY MASS INDEX: 22.59 KG/M2 | RESPIRATION RATE: 18 BRPM | SYSTOLIC BLOOD PRESSURE: 138 MMHG | DIASTOLIC BLOOD PRESSURE: 78 MMHG | OXYGEN SATURATION: 100 %

## 2024-04-01 DIAGNOSIS — E53.8 B12 DEFICIENCY: ICD-10-CM

## 2024-04-01 DIAGNOSIS — Z02.9 ADMINISTRATIVE ENCOUNTER: ICD-10-CM

## 2024-04-01 DIAGNOSIS — I10 PRIMARY HYPERTENSION: Primary | Chronic | ICD-10-CM

## 2024-04-01 PROCEDURE — 3044F HG A1C LEVEL LT 7.0%: CPT | Mod: CPTII,S$GLB,, | Performed by: FAMILY MEDICINE

## 2024-04-01 PROCEDURE — 2023F DILAT RTA XM W/O RTNOPTHY: CPT | Mod: CPTII,S$GLB,, | Performed by: FAMILY MEDICINE

## 2024-04-01 PROCEDURE — 99999 PR PBB SHADOW E&M-EST. PATIENT-LVL IV: CPT | Mod: PBBFAC,,, | Performed by: FAMILY MEDICINE

## 2024-04-01 PROCEDURE — 99213 OFFICE O/P EST LOW 20 MIN: CPT | Mod: S$GLB,,, | Performed by: FAMILY MEDICINE

## 2024-04-01 PROCEDURE — 3075F SYST BP GE 130 - 139MM HG: CPT | Mod: CPTII,S$GLB,, | Performed by: FAMILY MEDICINE

## 2024-04-01 PROCEDURE — 3008F BODY MASS INDEX DOCD: CPT | Mod: CPTII,S$GLB,, | Performed by: FAMILY MEDICINE

## 2024-04-01 PROCEDURE — 1160F RVW MEDS BY RX/DR IN RCRD: CPT | Mod: CPTII,S$GLB,, | Performed by: FAMILY MEDICINE

## 2024-04-01 PROCEDURE — 3078F DIAST BP <80 MM HG: CPT | Mod: CPTII,S$GLB,, | Performed by: FAMILY MEDICINE

## 2024-04-01 PROCEDURE — 1159F MED LIST DOCD IN RCRD: CPT | Mod: CPTII,S$GLB,, | Performed by: FAMILY MEDICINE

## 2024-04-01 NOTE — PROGRESS NOTES
Patient ID: Lacey Lange is a 53 y.o. female.    Chief Complaint: Follow-up (Form)    History of Present Illness    Patient presents today for disability paperwork.    Patient uses amlodipine 5 mg daily for hypertension control, with good general feeling and no reported work-related limitations.    Patient last took her B12 supplement yesterday but expresses uncertainty about continuation. She also follows an iron supplementation regimen, including Floradix twice daily for enhanced iron support, with no reported issues.    Patient has discontinued the use of her Dexcom.         Physical Exam    General: No acute distress. Well-developed. Well-nourished.  Eyes: EOMI. Sclerae anicteric.  HENT: Normocephalic. Atraumatic. Nares patent. Moist oral mucosa.  Cardiovascular: Regular rate. Regular rhythm. No murmurs. No rubs. No gallops. Normal S1, S2.  Respiratory: Normal respiratory effort. Clear to auscultation bilaterally. No rales. No rhonchi. No wheezing.  Musculoskeletal: No  obvious deformity.  Extremities: No lower extremity edema.  Neurological: Alert & oriented x3. No slurred speech. Normal gait.  Psychiatric: Normal mood. Normal affect. Good insight. Good judgment.  Skin: Warm. Dry. No rash.         Assessment & Plan    HYPERTENSION:  - Continue the patient's hypertension treatment with amlodipine 5mg daily, eliminating the repetition of this information.  VITAMIN B12 SUPPLEMENTATION:  - Recommend the patient to maintain their intake of OTC B12 supplements.  - Explained the benefits of B12 supplementation, emphasizing that any excess would simply be excreted via urine.  IRON SUPPLEMENTATION:  - Acknowledged the patient's use of Floradix for iron supplementation.  RETURN TO WORK:  - Confirmed the patient's ability to return to work without restrictions from February 15th.              Follow up if symptoms worsen or fail to improve.    This note was generated with the assistance of ambient listening  technology. Verbal consent was obtained by the patient and accompanying visitor(s) for the recording of patient appointment to facilitate this note. I attest to having reviewed and edited the generated note for accuracy, though some syntax or spelling errors may persist. Please contact the author of this note for any clarification.

## 2024-04-02 ENCOUNTER — TELEPHONE (OUTPATIENT)
Dept: PRIMARY CARE CLINIC | Facility: CLINIC | Age: 54
End: 2024-04-02
Payer: COMMERCIAL

## 2024-04-02 NOTE — TELEPHONE ENCOUNTER
----- Message from Raysa Meek sent at 4/2/2024  3:01 PM CDT -----  Contact: [phone] Patient  Pt is calling in regards to a form that was faxed over asking for clinical notes to go with the pts disability paperwork. Pts insurance [employer] is asking for the clinical paperwork to be sent please. Pt stated what was received yesterday was not sufficient enough they need what happened in Dec- Feb please. Pt stated they need clinical notes from Dec- Feb. Please call and advise.     [employer]  Fax # [phone]     REF# [id number]

## 2024-04-04 ENCOUNTER — TELEPHONE (OUTPATIENT)
Dept: PRIMARY CARE CLINIC | Facility: CLINIC | Age: 54
End: 2024-04-04
Payer: COMMERCIAL

## 2024-04-04 NOTE — TELEPHONE ENCOUNTER
----- Message from Jessika Holder sent at 4/4/2024  9:59 AM CDT -----  Contact: Mobile  [phone]  Patient is returning a phone call.  Who left a message for the patient:    Does patient know what this is regarding:   Would you like a call back, or a response through your [facility] portal?:   Call  Comments: Patient said she received a call on today.

## 2024-04-10 ENCOUNTER — TELEPHONE (OUTPATIENT)
Dept: PRIMARY CARE CLINIC | Facility: CLINIC | Age: 54
End: 2024-04-10
Payer: COMMERCIAL

## 2024-04-10 NOTE — TELEPHONE ENCOUNTER
----- Message from Letty Acevedo sent at 4/10/2024  1:22 PM CDT -----  Contact: [phone] ext [phone]  Marnie with [place] [facility] is calling she states she is needing back from the 12/27 clinical findings specific restrictions for short term disability and a treatment plan for return to work on 02/20     Please advise

## 2024-04-10 NOTE — TELEPHONE ENCOUNTER
Called Marnie quintana/[facility] [phone], No ans,LM on VM

## 2024-04-10 NOTE — TELEPHONE ENCOUNTER
----- Message from Regina Rosenthal sent at 4/10/2024  2:38 PM CDT -----  Contact: Marnie quintana/[place] [facility] [phone]  Marnie is returning a phone call.  Who left a message for the patient:  Lela Dye does know what the call is in regards to.  Would you like a call back, or a response through your [facility] portal?:  callback  Comments:                 Thank you

## 2024-04-11 ENCOUNTER — TELEPHONE (OUTPATIENT)
Dept: PRIMARY CARE CLINIC | Facility: CLINIC | Age: 54
End: 2024-04-11
Payer: COMMERCIAL

## 2024-04-17 ENCOUNTER — TELEPHONE (OUTPATIENT)
Dept: PRIMARY CARE CLINIC | Facility: CLINIC | Age: 54
End: 2024-04-17
Payer: COMMERCIAL

## 2024-04-17 NOTE — TELEPHONE ENCOUNTER
----- Message from Jessie Woody sent at 4/17/2024 10:58 AM CDT -----  Contact: Dionte/ [phone] ext [phone]  Dionte with [facility] following up on paper work fax on 04/12. Please call and advise. Thank you.

## 2024-05-23 DIAGNOSIS — I10 PRIMARY HYPERTENSION: Chronic | ICD-10-CM

## 2024-05-23 RX ORDER — AMLODIPINE BESYLATE 5 MG/1
5 TABLET ORAL
Qty: 90 TABLET | Refills: 3 | Status: SHIPPED | OUTPATIENT
Start: 2024-05-23

## 2024-05-23 NOTE — TELEPHONE ENCOUNTER
No care due was identified.  [facility] Embedded Care Due Messages. Reference number: [id number].   5/23/2024 10:15:26 AM CDT

## 2024-05-23 NOTE — TELEPHONE ENCOUNTER
Refill Decision Note   Lacey Lange  is requesting a refill authorization.  Brief Assessment and Rationale for Refill:  Approve     Medication Therapy Plan:         Comments:     Note composed:12:21 PM 05/23/2024

## 2024-07-24 ENCOUNTER — TELEPHONE (OUTPATIENT)
Dept: DIABETES | Facility: CLINIC | Age: 54
End: 2024-07-24
Payer: COMMERCIAL

## 2024-07-25 ENCOUNTER — OFFICE VISIT (OUTPATIENT)
Dept: DIABETES | Facility: CLINIC | Age: 54
End: 2024-07-25
Payer: COMMERCIAL

## 2024-07-25 VITALS
SYSTOLIC BLOOD PRESSURE: 121 MMHG | DIASTOLIC BLOOD PRESSURE: 84 MMHG | BODY MASS INDEX: 22.66 KG/M2 | HEIGHT: 66 IN | WEIGHT: 141 LBS | OXYGEN SATURATION: 100 % | HEART RATE: 67 BPM

## 2024-07-25 DIAGNOSIS — E78.5 DYSLIPIDEMIA, GOAL LDL BELOW 100: ICD-10-CM

## 2024-07-25 DIAGNOSIS — Z12.31 BREAST CANCER SCREENING BY MAMMOGRAM: ICD-10-CM

## 2024-07-25 DIAGNOSIS — E78.5 TYPE 2 DIABETES MELLITUS WITH HYPERLIPIDEMIA: Primary | ICD-10-CM

## 2024-07-25 DIAGNOSIS — N95.0 POSTMENOPAUSAL BLEEDING: ICD-10-CM

## 2024-07-25 DIAGNOSIS — E11.69 TYPE 2 DIABETES MELLITUS WITH HYPERLIPIDEMIA: Primary | ICD-10-CM

## 2024-07-25 DIAGNOSIS — E11.65 TYPE 2 DIABETES MELLITUS WITH HYPERGLYCEMIA, WITHOUT LONG-TERM CURRENT USE OF INSULIN: ICD-10-CM

## 2024-07-25 DIAGNOSIS — I10 PRIMARY HYPERTENSION: Chronic | ICD-10-CM

## 2024-07-25 DIAGNOSIS — Z71.9 HEALTH EDUCATION/COUNSELING: ICD-10-CM

## 2024-07-25 LAB — GLUCOSE SERPL-MCNC: 90 MG/DL (ref 70–110)

## 2024-07-25 PROCEDURE — 3061F NEG MICROALBUMINURIA REV: CPT | Mod: CPTII,S$GLB,, | Performed by: NURSE PRACTITIONER

## 2024-07-25 PROCEDURE — 3074F SYST BP LT 130 MM HG: CPT | Mod: CPTII,S$GLB,, | Performed by: NURSE PRACTITIONER

## 2024-07-25 PROCEDURE — 82962 GLUCOSE BLOOD TEST: CPT | Mod: S$GLB,,, | Performed by: NURSE PRACTITIONER

## 2024-07-25 PROCEDURE — 1159F MED LIST DOCD IN RCRD: CPT | Mod: CPTII,S$GLB,, | Performed by: NURSE PRACTITIONER

## 2024-07-25 PROCEDURE — 99214 OFFICE O/P EST MOD 30 MIN: CPT | Mod: S$GLB,,, | Performed by: NURSE PRACTITIONER

## 2024-07-25 PROCEDURE — 3066F NEPHROPATHY DOC TX: CPT | Mod: CPTII,S$GLB,, | Performed by: NURSE PRACTITIONER

## 2024-07-25 PROCEDURE — 3044F HG A1C LEVEL LT 7.0%: CPT | Mod: CPTII,S$GLB,, | Performed by: NURSE PRACTITIONER

## 2024-07-25 PROCEDURE — 3079F DIAST BP 80-89 MM HG: CPT | Mod: CPTII,S$GLB,, | Performed by: NURSE PRACTITIONER

## 2024-07-25 PROCEDURE — 1160F RVW MEDS BY RX/DR IN RCRD: CPT | Mod: CPTII,S$GLB,, | Performed by: NURSE PRACTITIONER

## 2024-07-25 PROCEDURE — 99999 PR PBB SHADOW E&M-EST. PATIENT-LVL IV: CPT | Mod: PBBFAC,,, | Performed by: NURSE PRACTITIONER

## 2024-07-25 PROCEDURE — 3008F BODY MASS INDEX DOCD: CPT | Mod: CPTII,S$GLB,, | Performed by: NURSE PRACTITIONER

## 2024-07-25 RX ORDER — LANCETS 33 GAUGE
1 EACH MISCELLANEOUS DAILY
Qty: 100 EACH | Refills: 3 | Status: SHIPPED | OUTPATIENT
Start: 2024-07-25

## 2024-07-25 RX ORDER — BLOOD SUGAR DIAGNOSTIC
1 STRIP MISCELLANEOUS DAILY
Qty: 100 EACH | Refills: 3 | Status: SHIPPED | OUTPATIENT
Start: 2024-07-25

## 2024-07-25 RX ORDER — PRAVASTATIN SODIUM 10 MG/1
10 TABLET ORAL DAILY
Qty: 90 TABLET | Refills: 2 | Status: SHIPPED | OUTPATIENT
Start: 2024-07-25 | End: 2025-07-25

## 2024-07-25 NOTE — ASSESSMENT & PLAN NOTE
Well controlled off medications  Needs to perform self blood glucose monitoring more frequently than every couple of months        -- Medication Changes:  remain off medications.   Continue to follow diet  Encouraged exercising  Asked that she perform self blood glucose monitoring at least once a week at alternating times      -- Reviewed goals of therapy are to get the best control we can without hypoglycemia.    -- Advised frequent self blood glucose monitoring.  Patient encouraged to document glucose results and bring them to every clinic visit.  At least once a week at alternating times  -- Hypoglycemia precautions discussed. Instructed on precautions before driving.    -- Call for Bg repeatedly < 70 or > 180.   -- Close adherence to lifestyle changes recommended.   -- Periodic follow ups for eye evaluations, foot care and dental care suggested.

## 2024-07-25 NOTE — ASSESSMENT & PLAN NOTE
Discussed ADA recommendations  LDL is above goal  Recommend that she start a low-dose statin  Pravastatin 10 mg daily

## 2024-07-25 NOTE — PROGRESS NOTES
CC:   Chief Complaint   Patient presents with    Diabetes Mellitus       HPI: Lacey Lange is a 53 y.o. female presents for a follow up visit today for the management of T2DM    She was diagnosed with Type 2 diabetes in July 2023 and was started on Metformin BID   Prescribed Trulicity but never received it     2019- with prediabetes   2013- gestational diabetes   Lost a baby at 8 months prior to her current daughter       Family hx of diabetes: mother, father,   Hospitalized for diabetes: denies   Insulin therapy: never on insulin     No personal or FH of thyroid cancer or personal of pancreatic cancer or pancreatitis.       Our last visit was in January of 2024  At that visit we discussed that she could stop using the Dexcom and just use it as needed  If she goes back to fingersticks just check daily at alternating times before meals mostly  We kept her off of all of her diabetes medications as she was well controlled  Her A1c has remained well controlled at 5.4%  Fructisamine level was higher at 316-   --this correlates to an A1c between 7 and 8%   She does attest to only checking every couple of months  Blood sugars reasonable in office today  She is not taking the statin LDL above goal      DIABETES COMPLICATIONS: none      Diabetes Management Status    ASA:  No    Statin: not taking statin   ACE/ARB: not taking     Screening or Prevention Patient's value Goal Complete/Controlled?   HgA1C Testing and Control   Lab Results   Component Value Date    HGBA1C 5.4 07/20/2024      Annually/Less than 8% Yes   Lipid profile : 07/20/2024 Annually Yes   LDL control Lab Results   Component Value Date    LDLCALC 142.8 07/20/2024    Annually/Less than 100 mg/dl  No   Nephropathy screening Lab Results   Component Value Date    LABMICR 13.0 07/20/2024     Lab Results   Component Value Date    PROTEINUA Negative 11/26/2023    Annually Yes   Blood pressure BP Readings from Last 1 Encounters:   07/25/24 121/84    Less  than 140/90 Yes   Dilated retinal exam : 01/18/2024 Annually Yes   Foot exam   : 07/25/2024 Annually No       CURRENT A1C:    Hemoglobin A1C   Date Value Ref Range Status   07/20/2024 5.4 4.0 - 5.6 % Final     Comment:     ADA Screening Guidelines:  5.7-6.4%  Consistent with prediabetes  >or=6.5%  Consistent with diabetes    High levels of fetal hemoglobin interfere with the HbA1C  assay. Heterozygous hemoglobin variants (HbS, HgC, etc)do  not significantly interfere with this assay.   However, presence of multiple variants may affect accuracy.     01/20/2024 5.1 4.0 - 5.6 % Final     Comment:     ADA Screening Guidelines:  5.7-6.4%  Consistent with prediabetes  >or=6.5%  Consistent with diabetes    High levels of fetal hemoglobin interfere with the HbA1C  assay. Heterozygous hemoglobin variants (HbS, HgC, etc)do  not significantly interfere with this assay.   However, presence of multiple variants may affect accuracy.     12/11/2023 6.0 (H) 4.0 - 5.6 % Final     Comment:     ADA Screening Guidelines:  5.7-6.4%  Consistent with prediabetes  >or=6.5%  Consistent with diabetes    High levels of fetal hemoglobin interfere with the HbA1C  assay. Heterozygous hemoglobin variants (HbS, HgC, etc)do  not significantly interfere with this assay.   However, presence of multiple variants may affect accuracy.         GOAL A1C: 6.5% without hypoglycemia     DM MEDICATIONS USED IN THE PAST: Metformin   Metfomrin XR- stopped due to poor breathing ???-- but was also extremely anemic   Glimepiride --- hypoglycemia   Jardiance-   Trulicity  -- never started       CURRENT DIABETES MEDICATIONS: None  -- off all medications since mid December        BLOOD GLUCOSE MONITORING:    Checking a couple of times every month   Per oral recall: 120-130's   If she eats bad - sometimes 160's after eating         Results for orders placed or performed in visit on 07/25/24   POCT Glucose, Hand-Held Device   Result Value Ref Range    POC Glucose 90 70  - 110 MG/DL         HYPOGLYCEMIA:  None        MEALS: eating 3 meals per day   Cut back on CHO   Drinks: water  Coffee daily in the AM         CURRENT EXERCISE:  No formal -- she does walk 6 flights of stairs every day       Review of Systems  Review of Systems   Constitutional:  Negative for appetite change, fatigue (improving) and unexpected weight change.   HENT:  Negative for trouble swallowing.    Eyes:  Negative for visual disturbance.   Respiratory:  Negative for shortness of breath.    Cardiovascular:  Negative for chest pain.   Gastrointestinal:  Negative for abdominal distention, abdominal pain, diarrhea and nausea.   Endocrine: Negative for polydipsia, polyphagia and polyuria.   Genitourinary:  Positive for vaginal bleeding (Reports intermittent postmenopausal bleeding).        No Nocturia    Skin:  Negative for wound.   Neurological:  Positive for dizziness (reports that this has improved). Negative for headaches.   Psychiatric/Behavioral:  The patient is nervous/anxious.        Physical Exam   Physical Exam  Vitals and nursing note reviewed.   Constitutional:       General: She is not in acute distress.     Appearance: She is well-developed. She is not ill-appearing.   HENT:      Head: Normocephalic and atraumatic.      Right Ear: External ear normal.      Left Ear: External ear normal.      Nose: Nose normal.   Neck:      Thyroid: No thyromegaly.      Trachea: No tracheal deviation.   Cardiovascular:      Rate and Rhythm: Normal rate and regular rhythm.      Heart sounds: No murmur heard.  Pulmonary:      Effort: Pulmonary effort is normal. No respiratory distress.      Breath sounds: Normal breath sounds.   Abdominal:      Palpations: Abdomen is soft.      Tenderness: There is no abdominal tenderness.      Hernia: No hernia is present.   Musculoskeletal:      Cervical back: Normal range of motion and neck supple.   Skin:     General: Skin is warm and dry.      Capillary Refill: Capillary refill takes  less than 2 seconds.      Findings: No rash.   Neurological:      Mental Status: She is alert and oriented to person, place, and time.      Cranial Nerves: No cranial nerve deficit.   Psychiatric:         Mood and Affect: Mood is anxious.         Behavior: Behavior normal.         Judgment: Judgment normal.         FOOT EXAMINATION: Appropriate  footwear       Protective Sensation (w/ 10 gram monofilament):  Right: Intact  Left: Intact    Visual Inspection:  Normal -  Bilateral and Nails Intact - without Evidence of Foot Deformity- Bilateral    Pedal Pulses:   Right: Present  Left: Present    Posterior Tibialis Pulses:   Right:Present  Left: Present          Lab Results   Component Value Date    TSH 2.917 07/20/2024           Type 2 diabetes mellitus with hyperlipidemia  Well controlled off medications  Needs to perform self blood glucose monitoring more frequently than every couple of months        -- Medication Changes:  remain off medications.   Continue to follow diet  Encouraged exercising  Asked that she perform self blood glucose monitoring at least once a week at alternating times      -- Reviewed goals of therapy are to get the best control we can without hypoglycemia.    -- Advised frequent self blood glucose monitoring.  Patient encouraged to document glucose results and bring them to every clinic visit.  At least once a week at alternating times  -- Hypoglycemia precautions discussed. Instructed on precautions before driving.    -- Call for Bg repeatedly < 70 or > 180.   -- Close adherence to lifestyle changes recommended.   -- Periodic follow ups for eye evaluations, foot care and dental care suggested.          Primary hypertension  BP goal is < 140/90.   Controlled   Blood pressure goals discussed with patient  On norvasc- reports compliance     Dyslipidemia, goal LDL below 100  Discussed ADA recommendations  LDL is above goal  Recommend that she start a low-dose statin  Pravastatin 10 mg  daily        She reports that she has been in menopause for 1.5 years.  She has had some intermittent postmenopausal bleeding.  I am recommending that she reach out to her OBGYN for follow-up and recommendations  She verbalized understanding      I spent a total of 30 minutes on the day of the visit.This includes face to face time and non-face to face time preparing to see the patient (eg, review of tests), obtaining and/or reviewing separately obtained history, documenting clinical information in the electronic or other health record, independently interpreting results and communicating results to the patient/family/caregiver, or care coordinator.      We discussed that she is well controlled and can be discharged back to primary care since she is not on any diabetes medications  Recommend that she see her PCP every 6 months and have an A1c done  We set her up for her next appointment with Dr. Potter  We discussed overdue health maintenance or soon to be overdue health maintenance  She verbalized understanding all questions answered        Follow up if symptoms worsen or fail to improve.  Schedule with Tariq in 6 months   Transfer care back to PCP   Follow up with me if needed   Schedule mammogram after 8/23/2024         Orders Placed This Encounter   Procedures    Mammo Digital Screening Bilat w/ Jacob     Standing Status:   Future     Standing Expiration Date:   1/25/2026     Order Specific Question:   May the Radiologist modify the order per protocol to meet the clinical needs of the patient?     Answer:   Yes    POCT Glucose, Hand-Held Device       Recommendations were discussed with the patient in detail  The patient verbalized understanding and agrees with the plan outlined as above.     This note was partly generated with [employer] voice recognition software. I apologize for any possible typographical errors.

## 2024-08-26 ENCOUNTER — HOSPITAL ENCOUNTER (OUTPATIENT)
Dept: RADIOLOGY | Facility: HOSPITAL | Age: 54
Discharge: HOME | End: 2024-08-26
Attending: NURSE PRACTITIONER
Payer: COMMERCIAL

## 2024-08-26 DIAGNOSIS — Z12.31 BREAST CANCER SCREENING BY MAMMOGRAM: ICD-10-CM

## 2024-08-26 PROCEDURE — 77067 SCR MAMMO BI INCL CAD: CPT | Mod: TC

## 2024-08-26 PROCEDURE — 77063 BREAST TOMOSYNTHESIS BI: CPT | Mod: TC

## 2024-08-28 ENCOUNTER — PATIENT MESSAGE (OUTPATIENT)
Dept: DIABETES | Facility: CLINIC | Age: 54
End: 2024-08-28
Payer: COMMERCIAL

## 2024-09-03 ENCOUNTER — PATIENT MESSAGE (OUTPATIENT)
Dept: DIABETES | Facility: CLINIC | Age: 54
End: 2024-09-03
Payer: COMMERCIAL

## 2024-09-19 ENCOUNTER — PATIENT MESSAGE (OUTPATIENT)
Dept: PRIMARY CARE CLINIC | Facility: CLINIC | Age: 54
End: 2024-09-19
Payer: COMMERCIAL

## 2024-09-25 ENCOUNTER — PATIENT MESSAGE (OUTPATIENT)
Dept: DIABETES | Facility: CLINIC | Age: 54
End: 2024-09-25
Payer: COMMERCIAL

## 2024-10-15 ENCOUNTER — PATIENT MESSAGE (OUTPATIENT)
Dept: DIABETES | Facility: CLINIC | Age: 54
End: 2024-10-15
Payer: COMMERCIAL

## 2024-10-28 ENCOUNTER — PATIENT MESSAGE (OUTPATIENT)
Dept: PRIMARY CARE CLINIC | Facility: CLINIC | Age: 54
End: 2024-10-28
Payer: COMMERCIAL

## 2024-11-11 NOTE — TELEPHONE ENCOUNTER
----- Message from Kacie Estrada sent at 4/11/2024  9:44 AM CDT -----  Contact: Marnie [phone] ext [phone]  Patient is returning a phone call.  Who left a message for the patient: Lela Motta MA  Does patient know what this is regarding:  to see if specific restrictions were places on 12/15/2023 and the timeframe. Supporting clinical exam findings and treatment plan. Please fax it to [phone]        
Office note for December has been faxed to [facility] at [phone].    
How Severe Are Your Spot(S)?: mild
What Is The Reason For Today's Visit?: Skin Lesions

## 2024-11-21 NOTE — TELEPHONE ENCOUNTER
Pt reports /87 with blurred vision. States that she took BP meds 30 mins ago. Advised to go to ED per protocol. VU. Encounter routed to provider.       Reason for Disposition   Systolic BP >= 160 OR Diastolic >= 100, and any cardiac (e.g., breathing difficulty, chest pain) or neurologic symptoms (e.g., new-onset blurred or double vision)    Additional Information   Negative: Sounds like a life-threatening emergency to the triager    Protocols used: Blood Pressure - High-A-OH     Writer unable to locate any documentation stating Dr. Ryan's office reached out to patient today by phone.  Call placed to patient for notification of above. No answer received x's 3 attempts. Left detailed message.

## 2025-01-27 ENCOUNTER — CLINICAL SUPPORT (OUTPATIENT)
Dept: PRIMARY CARE CLINIC | Facility: CLINIC | Age: 55
End: 2025-01-27
Attending: FAMILY MEDICINE
Payer: COMMERCIAL

## 2025-01-27 ENCOUNTER — OFFICE VISIT (OUTPATIENT)
Dept: PRIMARY CARE CLINIC | Facility: CLINIC | Age: 55
End: 2025-01-27
Payer: COMMERCIAL

## 2025-01-27 VITALS
TEMPERATURE: 98 F | HEIGHT: 66 IN | OXYGEN SATURATION: 100 % | RESPIRATION RATE: 18 BRPM | HEART RATE: 66 BPM | DIASTOLIC BLOOD PRESSURE: 80 MMHG | BODY MASS INDEX: 25.54 KG/M2 | WEIGHT: 158.94 LBS | SYSTOLIC BLOOD PRESSURE: 136 MMHG

## 2025-01-27 DIAGNOSIS — R79.89 LOW VITAMIN D LEVEL: ICD-10-CM

## 2025-01-27 DIAGNOSIS — E78.5 TYPE 2 DIABETES MELLITUS WITH HYPERLIPIDEMIA: ICD-10-CM

## 2025-01-27 DIAGNOSIS — D64.9 NORMOCYTIC ANEMIA: ICD-10-CM

## 2025-01-27 DIAGNOSIS — E11.69 TYPE 2 DIABETES MELLITUS WITH HYPERLIPIDEMIA: ICD-10-CM

## 2025-01-27 DIAGNOSIS — Z23 NEED FOR VACCINATION: ICD-10-CM

## 2025-01-27 DIAGNOSIS — E11.69 TYPE 2 DIABETES MELLITUS WITH HYPERLIPIDEMIA: Primary | ICD-10-CM

## 2025-01-27 DIAGNOSIS — E78.5 TYPE 2 DIABETES MELLITUS WITH HYPERLIPIDEMIA: Primary | ICD-10-CM

## 2025-01-27 DIAGNOSIS — E53.8 B12 DEFICIENCY: ICD-10-CM

## 2025-01-27 PROBLEM — D61.818 PANCYTOPENIA: Status: RESOLVED | Noted: 2023-12-04 | Resolved: 2025-01-27

## 2025-01-27 PROCEDURE — 3044F HG A1C LEVEL LT 7.0%: CPT | Mod: CPTII,S$GLB,, | Performed by: FAMILY MEDICINE

## 2025-01-27 PROCEDURE — 99999 PR PBB SHADOW E&M-EST. PATIENT-LVL IV: CPT | Mod: PBBFAC,,, | Performed by: FAMILY MEDICINE

## 2025-01-27 PROCEDURE — G0008 ADMIN INFLUENZA VIRUS VAC: HCPCS | Mod: S$GLB,,, | Performed by: FAMILY MEDICINE

## 2025-01-27 PROCEDURE — 3075F SYST BP GE 130 - 139MM HG: CPT | Mod: CPTII,S$GLB,, | Performed by: FAMILY MEDICINE

## 2025-01-27 PROCEDURE — 1159F MED LIST DOCD IN RCRD: CPT | Mod: CPTII,S$GLB,, | Performed by: FAMILY MEDICINE

## 2025-01-27 PROCEDURE — 90677 PCV20 VACCINE IM: CPT | Mod: S$GLB,,, | Performed by: FAMILY MEDICINE

## 2025-01-27 PROCEDURE — G0009 ADMIN PNEUMOCOCCAL VACCINE: HCPCS | Mod: S$GLB,,, | Performed by: FAMILY MEDICINE

## 2025-01-27 PROCEDURE — 99214 OFFICE O/P EST MOD 30 MIN: CPT | Mod: 25,S$GLB,, | Performed by: FAMILY MEDICINE

## 2025-01-27 PROCEDURE — 3008F BODY MASS INDEX DOCD: CPT | Mod: CPTII,S$GLB,, | Performed by: FAMILY MEDICINE

## 2025-01-27 PROCEDURE — 92228 IMG RTA DETC/MNTR DS PHY/QHP: CPT | Mod: TC,S$GLB,, | Performed by: FAMILY MEDICINE

## 2025-01-27 PROCEDURE — 1160F RVW MEDS BY RX/DR IN RCRD: CPT | Mod: CPTII,S$GLB,, | Performed by: FAMILY MEDICINE

## 2025-01-27 PROCEDURE — 90656 IIV3 VACC NO PRSV 0.5 ML IM: CPT | Mod: S$GLB,,, | Performed by: FAMILY MEDICINE

## 2025-01-27 PROCEDURE — 92228 IMG RTA DETC/MNTR DS PHY/QHP: CPT | Mod: 26,S$GLB,, | Performed by: OPTOMETRIST

## 2025-01-27 PROCEDURE — 3079F DIAST BP 80-89 MM HG: CPT | Mod: CPTII,S$GLB,, | Performed by: FAMILY MEDICINE

## 2025-01-27 PROCEDURE — 2025F 7 FLD RTA PHOTO W/O RTNOPTHY: CPT | Mod: CPTII,S$GLB,, | Performed by: OPTOMETRIST

## 2025-01-27 RX ORDER — BLOOD SUGAR DIAGNOSTIC
1 STRIP MISCELLANEOUS DAILY
Qty: 100 EACH | Refills: 3 | Status: SHIPPED | OUTPATIENT
Start: 2025-01-27

## 2025-01-27 NOTE — PROGRESS NOTES
Lacey Lange is a 54 y.o. female here for a diabetic eye screening with non-dilated fundus photos per .    Patient cooperative?: Yes  Small pupils?: No  Last eye exam:     For exam results, see Encounter Report.

## 2025-01-27 NOTE — PROGRESS NOTES
Verified pt by name and . NKDA. Per physician orders pt was administered Influenza IM to left deltoid using aseptic technique. Pt tolerated well. No adverse effects or pain reported. MD notified.   Verified pt by name and . NKDA. Per physician orders pt was administered Prevnar IM to right deltoid using aseptic technique. Pt tolerated well. No adverse effects or pain reported. MD notified.

## 2025-01-27 NOTE — PROGRESS NOTES
Assessment:       1. Type 2 diabetes mellitus with hyperlipidemia    2. Need for vaccination    3. B12 deficiency    4. Normocytic anemia    5. Low vitamin D level         Plan:       Type 2 diabetes mellitus with hyperlipidemia  -     Comprehensive Metabolic Panel; Future; Expected date: 01/27/2025  -     TSH; Future; Expected date: 01/27/2025  -     Hemoglobin A1C; Future; Expected date: 01/27/2025  -     Diabetic Eye Screening Photo; Future  -     ONETOUCH ULTRA TEST Strp; 1 strip by Other route once daily.  Dispense: 100 each; Refill: 3    Need for vaccination  -     influenza (Flulaval, Fluzone, Fluarix) 45 mcg/0.5 mL IM vaccine (> or = 6 mo) 0.5 mL  -     pneumoc 20-ruthy conj-dip cr(PF) (PREVNAR-20 (PF)) injection Syrg 0.5 mL    B12 deficiency  -     CBC Auto Differential; Future; Expected date: 01/27/2025  -     Vitamin B12; Future; Expected date: 01/27/2025  -     Folate; Future; Expected date: 01/27/2025    Normocytic anemia  -     CBC Auto Differential; Future; Expected date: 01/27/2025  -     Ferritin; Future; Expected date: 01/27/2025  -     Iron and TIBC; Future; Expected date: 01/27/2025  -     Vitamin B12; Future; Expected date: 01/27/2025  -     Folate; Future; Expected date: 01/27/2025    Low vitamin D level  -     Vitamin D; Future; Expected date: 01/27/2025      Assessment & Plan    - Assessed patient's diabetes management. Patient has been off diabetes medications for over 1 year.  - Reviewed recent lab results showing normal A1c and blood sugar.  - Concerned about recent 17-pound weight gain and increased carbohydrate intake.  - Will order updated labs to reassess A1c and diabetes status.  - Considering potential need for restarting diabetes medication based on new lab results.  - Noted patient's history of adverse reactions to metformin and another diabetes medication.  - Evaluated patient's adherence to current cholesterol and blood pressure medications.    DIABETES:   Monitored the patient's  diabetes status, noting they have been off diabetes medications for over a year.   Evaluated recent labs, which showed normal A1c and sugar levels.   Noted the patient occasionally checks blood sugar at home but has run out of test strips.   Ordered updated labs to check A1c levels.   Scheduled annual diabetic retinopathy screening (retinal imaging).   Considered prescribing new test strips and checking if the patient needs a new glucose meter or battery.   Discussed the potential need for medication if blood sugar have increased.   Explained the purpose and importance of regular diabetic eye screenings.   Noted the patient has a history of taking diabetes medications, including metformin and possibly glimepiride.   Educated patient on the importance of dietary control in managing diabetes and preventing weight gain.    HYPERTENSION:   Confirmed the patient is continuing with blood pressure medication.    HYPERLIPIDEMIA:   Confirmed the patient is continuing with cholesterol medication.    VITAMIN B DEFICIENCY:   Noted the patient recently restarted taking B12 supplements after stopping for a while.   Recommend continuing daily B12 vitamin supplement to address vitamin B deficiency.    WEIGHT MANAGEMENT:   Observed the patient has gained 17 lbs since last visit, now weighing 158 lbs.   Noted the patient reports consuming excessive carbohydrates and indulging in sweets.   Advised the patient to make dietary changes and be careful with sweets consumption.   Discussed the patient's diet and weight gain, advising on the need for dietary changes.   Recommend reducing carbohydrate intake and making dietary changes to address recent weight gain.    PNEUMOCOCCAL VACCINATION:   Provided information about the pneumococcal vaccine, including its coverage of 20 common strains of pneumonia and the recommendation for the next dose at age 65.   Administered pneumococcal vaccine.    INFLUENZA VACCINATION:   Administered flu  vaccine.    FOLLOW UP:   Follow up in 6 months, potentially sooner depending on labs results.   Contact the office if any concerns arise before the next scheduled appointment.       Medication List with Changes/Refills   Current Medications    AMLODIPINE (NORVASC) 5 MG TABLET    TAKE 1 TABLET(5 MG) BY MOUTH DAILY    CYANOCOBALAMIN (VITAMIN B-12) 1000 MCG TABLET    Take 1,000 mcg by mouth once daily.    ONETOUCH DELICA PLUS LANCET 33 GAUGE MISC    1 lancet  by Other route once daily.    PRAVASTATIN (PRAVACHOL) 10 MG TABLET    Take 1 tablet (10 mg total) by mouth once daily.   Changed and/or Refilled Medications    Modified Medication Previous Medication    ONETOUCH ULTRA TEST STRP ONETOUCH ULTRA TEST Strp       1 strip by Other route once daily.    1 strip by Other route once daily.         Subjective:    Patient ID: Lacey Lange is a 54 y.o. female.  Chief Complaint: Follow-up (6 month/reg check up)    HPI  History of Present Illness    CHIEF COMPLAINT:  Patient presents today for routine follow up.    DIABETES:  She discontinued all diabetes medications including metformin in December 2023 due to previous issues with metformin. She no longer monitors glucose due to cost of supplies.    WEIGHT MANAGEMENT:  She reports a 17-pound weight gain since last visit, now weighing 158 lbs. She acknowledges excessive carbohydrate consumption.    CURRENT MEDICATIONS:  She reports compliance with blood pressure and cholesterol medications. She recently resumed B12 supplementation after temporary discontinuation due to seasonal changes.    MEDICAL HISTORY:  She has history of blood transfusion which resulted in significant weakness affecting her ability to ambulate.    PREVENTIVE CARE:  Her last eye exam was in January 2024. She declines further COVID vaccinations.      ROS:  Constitutional: +weight gain  Neurological: +weakness       Review of Systems    Objective:      Vitals:    01/27/25 0845   BP: 136/80   BP  "Location: Right arm   Patient Position: Sitting   Pulse: 66   Resp: 18   Temp: 97.5 °F (36.4 °C)   TempSrc: Temporal   SpO2: 100%   Weight: 72.1 kg (158 lb 15.2 oz)   Height: 5' 6" (1.676 m)     BP Readings from Last 5 Encounters:   01/27/25 136/80   07/25/24 121/84   04/01/24 138/78   02/08/24 120/74   01/25/24 139/84     Wt Readings from Last 5 Encounters:   01/27/25 72.1 kg (158 lb 15.2 oz)   07/25/24 64 kg (141 lb)   04/01/24 63.7 kg (140 lb 8.7 oz)   02/08/24 64.7 kg (142 lb 8.4 oz)   01/25/24 65.3 kg (144 lb)     Physical Exam  Physical Exam    General: Well-developed. Well-nourished. No acute distress.  Eyes: EOMI. Sclerae anicteric.  HENT: Normocephalic. Atraumatic. Nares patent. Moist oral mucosa.  Cardiovascular: Regular rate. Regular rhythm. No murmurs. No rubs. No gallops. Normal S1, S2.  Respiratory: Normal respiratory effort. Clear to auscultation bilaterally. No rales. No rhonchi. No wheezing.  Musculoskeletal: No  obvious deformity.  Extremities: No lower extremity edema.  Neurological: Alert & oriented x3. No slurred speech. Normal gait.  Psychiatric: Normal mood. Normal affect. Good insight. Good judgment.  Skin: Warm. Dry. No rash.         Lab Results   Component Value Date    WBC 6.19 01/27/2025    HGB 12.7 01/27/2025    HCT 41.3 01/27/2025     01/27/2025    CHOL 202 (H) 07/20/2024    TRIG 46 07/20/2024    HDL 50 07/20/2024    ALT 13 01/27/2025    AST 19 01/27/2025     01/27/2025    K 4.0 01/27/2025     01/27/2025    CREATININE 0.8 01/27/2025    BUN 17 01/27/2025    CO2 22 (L) 01/27/2025    TSH 1.582 01/27/2025    HGBA1C 6.0 (H) 01/27/2025      This note was generated with the assistance of ambient listening technology. Verbal consent was obtained by the patient and accompanying visitor(s) for the recording of patient appointment to facilitate this note. I attest to having reviewed and edited the generated note for accuracy, though some syntax or spelling errors may persist. " Please contact the author of this note for any clarification.

## 2025-04-24 DIAGNOSIS — I10 PRIMARY HYPERTENSION: Chronic | ICD-10-CM

## 2025-04-24 RX ORDER — AMLODIPINE BESYLATE 5 MG/1
5 TABLET ORAL
Qty: 90 TABLET | Refills: 3 | Status: SHIPPED | OUTPATIENT
Start: 2025-04-24

## 2025-04-24 NOTE — TELEPHONE ENCOUNTER
No care due was identified.  [facility] Embedded Care Due Messages. Reference number: [id number].   4/24/2025 10:53:46 AM CDT

## 2025-04-24 NOTE — TELEPHONE ENCOUNTER
Refill Decision Note   Lacey Lange  is requesting a refill authorization.  Brief Assessment and Rationale for Refill:  Approve     Medication Therapy Plan:         Comments:     Note composed:12:59 PM 04/24/2025

## 2025-07-30 DIAGNOSIS — E11.9 TYPE 2 DIABETES MELLITUS WITHOUT COMPLICATION: ICD-10-CM

## 2025-08-01 ENCOUNTER — OFFICE VISIT (OUTPATIENT)
Dept: PRIMARY CARE CLINIC | Facility: CLINIC | Age: 55
End: 2025-08-01
Payer: COMMERCIAL

## 2025-08-01 VITALS
HEIGHT: 66 IN | DIASTOLIC BLOOD PRESSURE: 72 MMHG | OXYGEN SATURATION: 100 % | SYSTOLIC BLOOD PRESSURE: 120 MMHG | TEMPERATURE: 98 F | RESPIRATION RATE: 18 BRPM | BODY MASS INDEX: 27.99 KG/M2 | WEIGHT: 174.19 LBS | HEART RATE: 63 BPM

## 2025-08-01 DIAGNOSIS — E78.5 TYPE 2 DIABETES MELLITUS WITH HYPERLIPIDEMIA: Primary | ICD-10-CM

## 2025-08-01 DIAGNOSIS — Z12.31 ENCOUNTER FOR SCREENING MAMMOGRAM FOR BREAST CANCER: ICD-10-CM

## 2025-08-01 DIAGNOSIS — R63.5 WEIGHT GAIN: ICD-10-CM

## 2025-08-01 DIAGNOSIS — E53.8 B12 DEFICIENCY: ICD-10-CM

## 2025-08-01 DIAGNOSIS — E78.5 DYSLIPIDEMIA, GOAL LDL BELOW 100: ICD-10-CM

## 2025-08-01 DIAGNOSIS — R79.89 LOW VITAMIN D LEVEL: ICD-10-CM

## 2025-08-01 DIAGNOSIS — E11.69 TYPE 2 DIABETES MELLITUS WITH HYPERLIPIDEMIA: Primary | ICD-10-CM

## 2025-08-01 DIAGNOSIS — B37.31 YEAST VAGINITIS: ICD-10-CM

## 2025-08-01 DIAGNOSIS — I10 PRIMARY HYPERTENSION: Chronic | ICD-10-CM

## 2025-08-01 PROBLEM — D64.9 NORMOCYTIC ANEMIA: Status: RESOLVED | Noted: 2023-10-17 | Resolved: 2025-08-01

## 2025-08-01 PROCEDURE — 99999 PR PBB SHADOW E&M-EST. PATIENT-LVL IV: CPT | Mod: PBBFAC,,, | Performed by: FAMILY MEDICINE

## 2025-08-01 RX ORDER — PRAVASTATIN SODIUM 10 MG/1
10 TABLET ORAL DAILY
Qty: 90 TABLET | Refills: 3 | Status: SHIPPED | OUTPATIENT
Start: 2025-08-01

## 2025-08-01 RX ORDER — FLUCONAZOLE 150 MG/1
150 TABLET ORAL
Qty: 2 TABLET | Refills: 0 | Status: SHIPPED | OUTPATIENT
Start: 2025-08-01 | End: 2025-08-05

## 2025-08-01 NOTE — PROGRESS NOTES
Assessment:       1. Type 2 diabetes mellitus with hyperlipidemia    2. Weight gain    3. B12 deficiency    4. Low vitamin D level    5. Dyslipidemia, goal LDL below 100    6. Primary hypertension    7. Encounter for screening mammogram for breast cancer    8. Yeast vaginitis         Plan:       Type 2 diabetes mellitus with hyperlipidemia  -     Comprehensive Metabolic Panel; Future; Expected date: 08/01/2025  -     Lipid Panel; Future; Expected date: 08/01/2025  -     Hemoglobin A1C; Future; Expected date: 08/01/2025  -     Microalbumin/Creatinine Ratio, Urine; Future; Expected date: 08/01/2025  -      DIABETES FOOT EXAM  -     Ambulatory referral/consult to Diabetes Education    Weight gain  -     TSH; Future; Expected date: 08/01/2025    B12 deficiency  -     Vitamin B12; Future; Expected date: 08/01/2025    Low vitamin D level  -     Vitamin D; Future; Expected date: 08/01/2025    Dyslipidemia, goal LDL below 100  -     Comprehensive Metabolic Panel; Future; Expected date: 08/01/2025  -     pravastatin (PRAVACHOL) 10 MG tablet; Take 1 tablet (10 mg total) by mouth once daily.  Dispense: 90 tablet; Refill: 3    Primary hypertension    Encounter for screening mammogram for breast cancer  -     Mammo Digital Screening Bilat w/ Jacob (XPD); Future; Expected date: 08/01/2025    Yeast vaginitis  -     fluconazole (DIFLUCAN) 150 MG Tab; Take 1 tablet (150 mg total) by mouth every 72 hours. for 2 doses  Dispense: 2 tablet; Refill: 0      Assessment & Plan    E11.69, E78.5 Type 2 diabetes mellitus with hyperlipidemia  R63.5 Weight gain  E53.8 B12 deficiency  R79.89 Low vitamin D level  E78.5 Dyslipidemia, goal LDL below 100  I10 Primary hypertension  Z12.31 Encounter for screening mammogram for breast cancer  B37.31 Yeast vaginitis    - Significant weight gain of 30 lbs over the past year, with 16-18 lbs gained since January.  - High sugar intake from cereal, fruit juice, and grapes.  - Diabetic status and potential  need for medication adjustment.  - Yeast infection symptoms.  - Considered options for diabetes management, including lifestyle changes and potential medication options like Ozempic or SGLT2 inhibitors.  - Awaiting lab results before making definitive treatment decisions.    TYPE 2 DIABETES MELLITUS WITH HYPERLIPIDEMIA:   Educated on high sugar content in grape juice and cereal.   Explained that uncontrolled diabetes can lead to complications.   Discussed potential side effects of Ozempic, including nausea and constipation.   Explained SGLT2 inhibitors' mechanism of action in forcing excess glucose excretion through urine.   Patient to avoid cereal for breakfast.   Patient to eliminate fruit juice, especially grape juice.   Patient to limit intake of high-sugar fruits like grapes and bananas.   Recommend increasing berries as a fruit option.   Patient to continue eating vegetables like green beans, spinach, and broccoli.   Referred to diabetes education for refresher coaching.   Ordered fasting labs and urine sample for tomorrow morning.   Follow up in 6 months, may be adjusted to 3 months based on lab results.   Contact office when lab results are available.    WEIGHT GAIN:   Discussed potential side effects of Ozempic, including nausea and constipation.   Patient to avoid cereal for breakfast.   Patient to eliminate fruit juice, especially grape juice.   Patient to limit intake of high-sugar fruits like grapes and bananas.   Recommend increasing berries as a fruit option.   Patient to continue eating vegetables like green beans, spinach, and broccoli.    DEFICIENCY:   Ordered fasting labs and urine sample for tomorrow morning.   Follow up in 6 months, may be adjusted to 3 months based on lab results.   Contact office when lab results are available.    LOW VITAMIN D LEVEL:   Ordered fasting labs and urine sample for tomorrow morning.   Follow up in 6 months, may be adjusted to 3 months based on lab results.    Contact office when lab results are available.    DYSLIPIDEMIA, GOAL LDL BELOW 100:   Ordered fasting labs and urine sample for tomorrow morning.   Follow up in 6 months, may be adjusted to 3 months based on lab results.   Contact office when lab results are available.    PRIMARY HYPERTENSION:   Ordered fasting labs and urine sample for tomorrow morning.   Follow up in 6 months, may be adjusted to 3 months based on lab results.   Contact office when lab results are available.    ENCOUNTER FOR SCREENING MAMMOGRAM FOR BREAST CANCER:   Ordered mammogram due at the end of the month.    YEAST VAGINITIS:   Started Fluconazole: Take 1 pill, then take another pill 3 days later for yeast infection treatment.       Medication List with Changes/Refills   New Medications    FLUCONAZOLE (DIFLUCAN) 150 MG TAB    Take 1 tablet (150 mg total) by mouth every 72 hours. for 2 doses   Current Medications    AMLODIPINE (NORVASC) 5 MG TABLET    TAKE 1 TABLET(5 MG) BY MOUTH DAILY   Changed and/or Refilled Medications    Modified Medication Previous Medication    PRAVASTATIN (PRAVACHOL) 10 MG TABLET pravastatin (PRAVACHOL) 10 MG tablet       Take 1 tablet (10 mg total) by mouth once daily.    Take 1 tablet (10 mg total) by mouth once daily.   Discontinued Medications    CYANOCOBALAMIN (VITAMIN B-12) 1000 MCG TABLET    Take 1,000 mcg by mouth once daily.    ONETOUCH DELICA PLUS LANCET 33 GAUGE MISC    1 lancet  by Other route once daily.    ONETOUCH ULTRA TEST STRP    1 strip by Other route once daily.         Subjective:    Patient ID: Lacey Lange is a 54 y.o. female.  Chief Complaint: Follow-up (6 month reg check up. )    Follow-up      History of Present Illness    CHIEF COMPLAINT:  Patient presents today for six month follow-up and weight gain.    WEIGHT MANAGEMENT:  She reports significant weight gain of 30 lbs from last year, with 16-18 lbs gained since January. She acknowledges reduced vegetable consumption compared to  "previous dietary habits and expresses concern about weight gain with desire to improve nutritional choices.    DIET:  Current diet consists of breakfast with cereal or milk, occasionally eggs. Beverages include diet drinks and grape juice at home. She consumes grapes and bananas. Vegetable intake includes green beans, spinach, greens, and broccoli, though less frequently than in the past.    DIABETES:  She has a history of diabetes and is aware of dietary challenges related to diabetes and sugar intake. She has experienced numbness and tingling in her feet. She previously did not tolerate Glimepiride or Metformin, and currently is not taking any diabetes medications. She expresses concern about potential medication side effects and demonstrates limited understanding of diabetes management.    VAGINAL SYMPTOMS:  She reports experiencing severe vaginal itching for approximately two weeks, describing it as causing significant discomfort and expressing desire for symptom resolution.    MEDICATIONS:  She has blood pressure medication with refills available until next April. She reports need for Pravastatin refill. She previously tried Glimepiride and Metformin for diabetes but did not tolerate them well and is currently not taking any diabetes medication.      ROS:  Constitutional: +weight gain  Female Genitourinary: +vaginal itching or burning  Neurological: +numbness, +tingling       Review of Systems    Objective:      Vitals:    08/01/25 1214   BP: 120/72   BP Location: Left arm   Patient Position: Sitting   Pulse: 63   Resp: 18   Temp: 97.9 °F (36.6 °C)   TempSrc: Oral   SpO2: 100%   Weight: 79 kg (174 lb 2.6 oz)   Height: 5' 6" (1.676 m)     BP Readings from Last 5 Encounters:   08/01/25 120/72   01/27/25 136/80   07/25/24 121/84   04/01/24 138/78   02/08/24 120/74     Wt Readings from Last 5 Encounters:   08/01/25 79 kg (174 lb 2.6 oz)   01/27/25 72.1 kg (158 lb 15.2 oz)   07/25/24 64 kg (141 lb)   04/01/24 63.7 " kg (140 lb 8.7 oz)   02/08/24 64.7 kg (142 lb 8.4 oz)     Physical Exam  Feet:      Right foot:      Protective Sensation: 10 sites tested.  10 sites sensed.      Skin integrity: Skin integrity normal. No ulcer or skin breakdown.      Left foot:      Protective Sensation: 10 sites tested.  10 sites sensed.      Skin integrity: Skin integrity normal. No ulcer or skin breakdown.       Physical Exam    General: Well-developed. Well-nourished. No acute distress.  Eyes: EOMI. Sclerae anicteric.  HENT: Normocephalic. Atraumatic. Nares patent. Moist oral mucosa.  Cardiovascular: Regular rate. Regular rhythm. No murmurs. No rubs. No gallops. Normal S1, S2.  Respiratory: Normal respiratory effort. Clear to auscultation bilaterally. No rales. No rhonchi. No wheezing.  Musculoskeletal: No  obvious deformity.  Extremities: No lower extremity edema.  Neurological: Alert & oriented x3. No slurred speech. Normal gait. Monofilament testing reveals decreased sensation in multiple points on feet.  Psychiatric: Normal mood. Normal affect. Good insight. Good judgment.  Skin: Warm. Dry. No rash.         Lab Results   Component Value Date    WBC 6.19 01/27/2025    HGB 12.7 01/27/2025    HCT 41.3 01/27/2025     01/27/2025    CHOL 202 (H) 07/20/2024    TRIG 46 07/20/2024    HDL 50 07/20/2024    ALT 13 01/27/2025    AST 19 01/27/2025     01/27/2025    K 4.0 01/27/2025     01/27/2025    CREATININE 0.8 01/27/2025    BUN 17 01/27/2025    CO2 22 (L) 01/27/2025    TSH 1.582 01/27/2025    HGBA1C 6.0 (H) 01/27/2025      This note was generated with the assistance of ambient listening technology. Verbal consent was obtained by the patient and accompanying visitor(s) for the recording of patient appointment to facilitate this note. I attest to having reviewed and edited the generated note for accuracy, though some syntax or spelling errors may persist. Please contact the author of this note for any clarification.

## 2025-08-02 PROCEDURE — 82043 UR ALBUMIN QUANTITATIVE: CPT | Performed by: FAMILY MEDICINE

## 2025-08-04 ENCOUNTER — RESULTS FOLLOW-UP (OUTPATIENT)
Dept: PRIMARY CARE CLINIC | Facility: CLINIC | Age: 55
End: 2025-08-04
Payer: COMMERCIAL

## 2025-08-04 DIAGNOSIS — E78.5 TYPE 2 DIABETES MELLITUS WITH HYPERLIPIDEMIA: Primary | ICD-10-CM

## 2025-08-04 DIAGNOSIS — E11.69 TYPE 2 DIABETES MELLITUS WITH HYPERLIPIDEMIA: Primary | ICD-10-CM

## 2025-08-15 ENCOUNTER — OFFICE VISIT (OUTPATIENT)
Dept: PRIMARY CARE CLINIC | Facility: CLINIC | Age: 55
End: 2025-08-15
Payer: COMMERCIAL

## 2025-08-15 VITALS
DIASTOLIC BLOOD PRESSURE: 70 MMHG | OXYGEN SATURATION: 95 % | BODY MASS INDEX: 27.39 KG/M2 | HEART RATE: 84 BPM | TEMPERATURE: 98 F | HEIGHT: 66 IN | SYSTOLIC BLOOD PRESSURE: 126 MMHG | RESPIRATION RATE: 16 BRPM | WEIGHT: 170.44 LBS

## 2025-08-15 DIAGNOSIS — R10.13 EPIGASTRIC PAIN: Primary | ICD-10-CM

## 2025-08-15 DIAGNOSIS — K21.9 GASTROESOPHAGEAL REFLUX DISEASE, UNSPECIFIED WHETHER ESOPHAGITIS PRESENT: ICD-10-CM

## 2025-08-15 DIAGNOSIS — B37.9 YEAST INFECTION: ICD-10-CM

## 2025-08-15 PROCEDURE — 99999 PR PBB SHADOW E&M-EST. PATIENT-LVL IV: CPT | Mod: PBBFAC,,,

## 2025-08-15 RX ORDER — FLUCONAZOLE 150 MG/1
150 TABLET ORAL DAILY
Qty: 2 TABLET | Refills: 0 | Status: SHIPPED | OUTPATIENT
Start: 2025-08-15

## 2025-09-03 ENCOUNTER — HOSPITAL ENCOUNTER (OUTPATIENT)
Dept: RADIOLOGY | Facility: HOSPITAL | Age: 55
Discharge: HOME | End: 2025-09-03
Attending: FAMILY MEDICINE
Payer: COMMERCIAL

## 2025-09-03 DIAGNOSIS — Z12.31 ENCOUNTER FOR SCREENING MAMMOGRAM FOR BREAST CANCER: ICD-10-CM

## 2025-09-03 PROCEDURE — 77063 BREAST TOMOSYNTHESIS BI: CPT | Mod: TC
